# Patient Record
Sex: MALE | Race: WHITE | NOT HISPANIC OR LATINO | Employment: OTHER | URBAN - METROPOLITAN AREA
[De-identification: names, ages, dates, MRNs, and addresses within clinical notes are randomized per-mention and may not be internally consistent; named-entity substitution may affect disease eponyms.]

---

## 2019-10-14 ENCOUNTER — OFFICE VISIT (OUTPATIENT)
Dept: ENDOCRINOLOGY | Facility: CLINIC | Age: 59
End: 2019-10-14
Payer: MEDICARE

## 2019-10-14 ENCOUNTER — LAB VISIT (OUTPATIENT)
Dept: LAB | Facility: HOSPITAL | Age: 59
End: 2019-10-14
Attending: NURSE PRACTITIONER
Payer: MEDICARE

## 2019-10-14 ENCOUNTER — PATIENT OUTREACH (OUTPATIENT)
Dept: ADMINISTRATIVE | Facility: HOSPITAL | Age: 59
End: 2019-10-14

## 2019-10-14 VITALS
BODY MASS INDEX: 37.05 KG/M2 | RESPIRATION RATE: 18 BRPM | WEIGHT: 273.56 LBS | HEART RATE: 60 BPM | DIASTOLIC BLOOD PRESSURE: 74 MMHG | SYSTOLIC BLOOD PRESSURE: 98 MMHG | HEIGHT: 72 IN

## 2019-10-14 DIAGNOSIS — E11.65 TYPE 2 DIABETES MELLITUS WITH HYPERGLYCEMIA, WITHOUT LONG-TERM CURRENT USE OF INSULIN: Primary | ICD-10-CM

## 2019-10-14 DIAGNOSIS — Z86.73 H/O: CVA (CEREBROVASCULAR ACCIDENT): ICD-10-CM

## 2019-10-14 DIAGNOSIS — E66.01 CLASS 2 SEVERE OBESITY DUE TO EXCESS CALORIES WITH SERIOUS COMORBIDITY AND BODY MASS INDEX (BMI) OF 37.0 TO 37.9 IN ADULT: ICD-10-CM

## 2019-10-14 DIAGNOSIS — I25.10 CORONARY ARTERY DISEASE INVOLVING NATIVE CORONARY ARTERY OF NATIVE HEART WITHOUT ANGINA PECTORIS: ICD-10-CM

## 2019-10-14 DIAGNOSIS — E78.5 HYPERLIPIDEMIA, UNSPECIFIED HYPERLIPIDEMIA TYPE: ICD-10-CM

## 2019-10-14 DIAGNOSIS — E11.65 TYPE 2 DIABETES MELLITUS WITH HYPERGLYCEMIA, WITHOUT LONG-TERM CURRENT USE OF INSULIN: ICD-10-CM

## 2019-10-14 DIAGNOSIS — I10 ESSENTIAL HYPERTENSION: ICD-10-CM

## 2019-10-14 LAB
ALBUMIN SERPL BCP-MCNC: 3.6 G/DL (ref 3.5–5.2)
ALP SERPL-CCNC: 104 U/L (ref 55–135)
ALT SERPL W/O P-5'-P-CCNC: 18 U/L (ref 10–44)
ANION GAP SERPL CALC-SCNC: 9 MMOL/L (ref 8–16)
AST SERPL-CCNC: 18 U/L (ref 10–40)
BILIRUB SERPL-MCNC: 0.8 MG/DL (ref 0.1–1)
BUN SERPL-MCNC: 14 MG/DL (ref 6–20)
CALCIUM SERPL-MCNC: 9.6 MG/DL (ref 8.7–10.5)
CHLORIDE SERPL-SCNC: 101 MMOL/L (ref 95–110)
CO2 SERPL-SCNC: 27 MMOL/L (ref 23–29)
CREAT SERPL-MCNC: 0.8 MG/DL (ref 0.5–1.4)
EST. GFR  (AFRICAN AMERICAN): >60 ML/MIN/1.73 M^2
EST. GFR  (NON AFRICAN AMERICAN): >60 ML/MIN/1.73 M^2
ESTIMATED AVG GLUCOSE: 258 MG/DL (ref 68–131)
GLUCOSE SERPL-MCNC: 303 MG/DL (ref 70–110)
HBA1C MFR BLD HPLC: 10.6 % (ref 4–5.6)
POTASSIUM SERPL-SCNC: 4.2 MMOL/L (ref 3.5–5.1)
PROT SERPL-MCNC: 7.2 G/DL (ref 6–8.4)
SODIUM SERPL-SCNC: 137 MMOL/L (ref 136–145)
TSH SERPL DL<=0.005 MIU/L-ACNC: 0.73 UIU/ML (ref 0.4–4)

## 2019-10-14 PROCEDURE — 3078F PR MOST RECENT DIASTOLIC BLOOD PRESSURE < 80 MM HG: ICD-10-PCS | Mod: CPTII,S$GLB,, | Performed by: NURSE PRACTITIONER

## 2019-10-14 PROCEDURE — 80053 COMPREHEN METABOLIC PANEL: CPT

## 2019-10-14 PROCEDURE — 84443 ASSAY THYROID STIM HORMONE: CPT

## 2019-10-14 PROCEDURE — 99999 PR PBB SHADOW E&M-NEW PATIENT-LVL V: CPT | Mod: PBBFAC,,, | Performed by: NURSE PRACTITIONER

## 2019-10-14 PROCEDURE — 3074F PR MOST RECENT SYSTOLIC BLOOD PRESSURE < 130 MM HG: ICD-10-PCS | Mod: CPTII,S$GLB,, | Performed by: NURSE PRACTITIONER

## 2019-10-14 PROCEDURE — 83036 HEMOGLOBIN GLYCOSYLATED A1C: CPT

## 2019-10-14 PROCEDURE — 36415 COLL VENOUS BLD VENIPUNCTURE: CPT | Mod: PO

## 2019-10-14 PROCEDURE — 3008F BODY MASS INDEX DOCD: CPT | Mod: CPTII,S$GLB,, | Performed by: NURSE PRACTITIONER

## 2019-10-14 PROCEDURE — 99999 PR PBB SHADOW E&M-NEW PATIENT-LVL V: ICD-10-PCS | Mod: PBBFAC,,, | Performed by: NURSE PRACTITIONER

## 2019-10-14 PROCEDURE — 3078F DIAST BP <80 MM HG: CPT | Mod: CPTII,S$GLB,, | Performed by: NURSE PRACTITIONER

## 2019-10-14 PROCEDURE — 99204 PR OFFICE/OUTPT VISIT, NEW, LEVL IV, 45-59 MIN: ICD-10-PCS | Mod: S$GLB,,, | Performed by: NURSE PRACTITIONER

## 2019-10-14 PROCEDURE — 3008F PR BODY MASS INDEX (BMI) DOCUMENTED: ICD-10-PCS | Mod: CPTII,S$GLB,, | Performed by: NURSE PRACTITIONER

## 2019-10-14 PROCEDURE — 99204 OFFICE O/P NEW MOD 45 MIN: CPT | Mod: S$GLB,,, | Performed by: NURSE PRACTITIONER

## 2019-10-14 PROCEDURE — 3074F SYST BP LT 130 MM HG: CPT | Mod: CPTII,S$GLB,, | Performed by: NURSE PRACTITIONER

## 2019-10-14 RX ORDER — ALPRAZOLAM 1 MG/1
1 TABLET ORAL 3 TIMES DAILY PRN
Refills: 0 | COMMUNITY
Start: 2019-09-14

## 2019-10-14 RX ORDER — INSULIN DEGLUDEC 100 U/ML
18 INJECTION, SOLUTION SUBCUTANEOUS NIGHTLY
Qty: 15 ML | Refills: 6 | Status: SHIPPED | OUTPATIENT
Start: 2019-10-14 | End: 2019-10-28

## 2019-10-14 RX ORDER — CALCIUM CITRATE/VITAMIN D3 200MG-6.25
TABLET ORAL
Refills: 3 | COMMUNITY
Start: 2019-10-07 | End: 2020-12-30 | Stop reason: SDUPTHER

## 2019-10-14 RX ORDER — CLONIDINE HYDROCHLORIDE 0.3 MG/1
0.3 TABLET ORAL 2 TIMES DAILY
Refills: 2 | COMMUNITY
Start: 2019-09-19

## 2019-10-14 RX ORDER — GLYBURIDE 5 MG/1
TABLET ORAL
Refills: 4 | COMMUNITY
Start: 2019-09-19 | End: 2020-03-27 | Stop reason: SDUPTHER

## 2019-10-14 RX ORDER — CLOPIDOGREL BISULFATE 75 MG/1
TABLET ORAL
Refills: 1 | COMMUNITY
Start: 2019-10-02

## 2019-10-14 RX ORDER — PEN NEEDLE, DIABETIC 30 GX3/16"
NEEDLE, DISPOSABLE MISCELLANEOUS
Qty: 50 EACH | Refills: 6 | Status: SHIPPED | OUTPATIENT
Start: 2019-10-14 | End: 2020-12-30 | Stop reason: SDUPTHER

## 2019-10-14 NOTE — PROGRESS NOTES
CC: Mr. Pedro Rodriguez arrives today for management of Type 2 DM and review of chronic medical conditions, as listed in the Visit Diagnosis section of this encounter.       HPI: Mr. Pedro Rodriguez was diagnosed with Type 2 DM in his 40s. Initial treatment consisted of orals. Unknown FH of DM, as he is adopted. Denies hospitalizations due to DM.     This is his first time being seen in endocrine.     He is accompanied by his sister.     Patient states that metformin was discontinued after having stroke in summer of this year. He was instead started on glyburide. He states that ever since his glucose has been elevated. However, he admits that glucose was elevated before his stroke.     He does have an irregular sleeping pattern - may wake at 4:00 AM or 10:00 AM.    BG readings are checked 4x/day. Brings his meter, 7 day average: 255, 14 day average: 276, 30 day average: 282    Hypoglycemia: No    Missing Insulin/PO medication doses: No    Exercise: Walks 5 min at a time.    Dietary Habits: Eats 3 meals/day. No snacking. Was drinking several glasses of sweetened green tea but has cut down recently. .    Last DM education appointment: unknown    He does not have PCP but wishes to establish care with one.     He takes Xanax for anxiety. He is very worried about his BP today. He took his BP meds today as usual. He is not symptomatic.     His cardiologist is Dr. Zuniga. He follows every 4 months. He had CABG x 6 in ~ 2010.       CURRENT DIABETIC MEDS: Januvia 50 mg daily, glyburide 10 mg daily with breakfast.   Glucometer type: True Metrix    Previous DM treatments:  Metformin   Starlix?    Last Eye Exam: > 1 year. Will wait to reschedule once having better DM control.  Last Podiatry Exam: n/a    REVIEW OF SYSTEMS  Constitutional: no c/o fatigue, weakness, or weight loss.   Eyes: denies visual disturbances.  ENT: denies dysphagia. + hearing loss  Cardiac: no palpitations or chest pain.  Respiratory: no cough or dyspnea.  GI: no  c/o abdominal pain or nausea. Denies h/o pancreatitis.   : denies urinary frequency, burning, discharge, frequent UTIs  Skin: no lesions or rashes.  Musculoskeletal: denies difficulty mobilizing, denies muscle pains. Reports bilateral knee pain.   Neuro: no numbness, tingling, or parasthesias.  Endocrine: denies polyphagia, polydipsia, polyuria      Personally reviewed Past Medical, Surgical, Social History.    Vital Signs  BP 96/74   Pulse 60   Resp 18   Ht 6' (1.829 m)   Wt 124.1 kg (273 lb 9.5 oz)   BMI 37.11 kg/m²     Personally reviewed the below labs:    No results found for: HGBA1C    Chemistry    No results found for: NA, K, CL, CO2, BUN, CREATININE, GLU No results found for: CALCIUM, ALKPHOS, AST, ALT, BILITOT, ESTGFRAFRICA, EGFRNONAA       No results found for: CHOL  No results found for: HDL  No results found for: LDLCALC  No results found for: TRIG  No results found for: CHOLHDL    No results found for: MICALBCREAT  No results found for: TSH    CrCl cannot be calculated (No successful lab value found.).    No results found for: [id number]      PHYSICAL EXAMINATION  Constitutional: Appears well, no distress  Neck: Supple, trachea midline; no thyromegaly or nodules.   Respiratory: CTA, even and unlabored.  Cardiovascular: RRR, no murmurs, no carotid bruits. DP pulses  2+ bilaterally; no edema.    GI: bowel sounds active, no hernia noted  Lymph: no cervical or supraclavicular lymphadenopathy  Skin: warm and dry; no lipohypertrophy, or acanthosis nigracans observed.  Psych: anxious affect, pleasant mood, conversant  Musculoskeletal: steady gait, no clubbing, full ROM to all extremities  Neuro: no loss of protective sensation via 10 gm monofilament or vibratory exam bilaterally, DTR 1+ BUE/1+BLE.  Feet: no open wounds; appropriate footwear. + callus noted to lateral aspect of R hallux    A1c target < 7.5%      Assessment/Plan  1. Type 2 diabetes mellitus with hyperglycemia, without long-term current  use of insulin  -- Uncontrolled. Unclear why metformin was discontinued. Glucoses are globally elevated.   -- A1c, CMP, TSH today.  -- start Tresiba 18 units QHS (weight based using 0.3 u/kg). Discussed pen use/storage, proper timing, administration technique.   -- continue Januvia and glyburide for now.   -- based on lab findings, will possibly resume metformin.   -- check BG 2x/day, alternating times and bring log to review in 2 weeks    -- Discussed diagnosis of DM, A1c goals, progression of disease, long term complications and tx options.  -- Reviewed hypoglycemia management: treat with 1/2 glass of juice, 1/2 can regular coke, or 4 glucose tablets. Monitor and repeat treatment every 15 minutes until BG is >70 Then have a snack, which includes a complex carbohydrate and protein.     -- takes statin, ace-I, aspirin     2. Coronary artery disease involving native coronary artery of native heart without angina pectoris  --avoid hypoglycemia   -- follows with cardiology   3. Essential hypertension  -- low end of normal today. Pt is asymptimatic  -- due to pt's anxiety regarding his BP, I advised him to notify his cardiologist.    4. Hyperlipidemia, unspecified hyperlipidemia type  -- continue atorvastatin  -- will check lipid panel in the future   5. H/O: CVA (cerebrovascular accident)  -- avoid hypoglycemia   6. Class 2 severe obesity due to excess calories with serious comorbidity and body mass index (BMI) of 37.0 to 37.9 in adult  -- increasing insulin resistance  -- will consider GLP-1RA in place of DPP4-I, if appropriate       FOLLOW UP  Follow up in about 2 weeks (around 10/28/2019).   Patient instructed to bring BG logs to each follow up   Patient encouraged to call for any BG/medication issues, concerns, or questions.    Orders Placed This Encounter   Procedures    Hemoglobin A1c    Comprehensive metabolic panel    TSH

## 2019-10-14 NOTE — PATIENT INSTRUCTIONS
Notify your cardiologist of your blood pressures today:  96/74, 98/74      Hypoglycemia (Low Blood Sugar)     Fast-acting sugar includes a cup of nonfat milk.     Too little sugar (glucose) in your blood is called hypoglycemia or low blood sugar. Low blood sugar usually means anything lower than 70 mg/dL. Talk with your healthcare provider about your target range and what level is too low for you. Diabetes itself doesnt cause low blood sugar. But some of the treatments for diabetes, such as pills or insulin, may raise your risk for it. Low blood sugar may cause you to pass out or have a seizure. So always treat low blood sugar right away, but don't overeat.  Special note: Always carry a source of fast-acting sugar and a snack in case of hypoglycemia.   What you may notice  If you have low blood sugar, you may have one or more of these symptoms:  · Shakiness or dizziness  · Cold, clammy skin or sweating  · Feelings of hunger  · Headache  · Nervousness  · A hard, fast heartbeat  · Weakness  · Confusion or irritability  · Blurred vision  · Having nightmares or waking up confused or sweating  · Numbness or tingling in the lips or tongue  What you should do  Here are tips to follow if you have hypoglycemia:   · First check your blood sugar. If it is too low (out of your target range), eat or drink 15 to 20 grams of fast-acting sugar. This may be 3 to 4 glucose tablets, 4 ounces (half a cup) of fruit juice or regular (nondiet) soda, 8 ounces (1 cup) of fat-free milk, or 1 tablespoon of honey. Dont take more than this, or your blood sugar may go too high.  · Wait 15 minutes. Then recheck your blood sugar if you can.  · If your blood sugar is still too low, repeat the steps above and check your blood sugar again. If your blood sugar still has not returned to your target range, contact your healthcare provider or seek emergency care.  · Once your blood sugar returns to target range, eat a snack or meal.  Preventing low  blood sugar  Things you can do include the following:   · If your condition needs a strict treatment plan, eat your meals and snacks at the same times each day. Dont skip meals!  · If your treatment plan lets you change when you eat and what you eat, learn how to change the time and dose of your rapid-acting insulin to match this.   · Ask your healthcare provider if it is safe for you to drink alcohol. Never drink on an empty stomach.  · Take your medicine at the prescribed times.  · Always carry a source of fast-acting sugar and a snack when youre away from home.  Other things to do  Additional tips include the following:  · Carry a medical ID card, a compact USB drive, or wear a medical alert bracelet or necklace. It should say that you have diabetes. It should also say what to do if you pass out or have a seizure.  · Make sure your family, friends, and coworkers know the signs of low blood sugar. Tell them what to do if your blood sugar falls very low and you cant treat yourself.  · Keep a glucagon emergency kit handy. Be sure your family, friends, and coworkers know how and when to use it. Check it regularly and replace the glucagon before it expires.  · Talk with your health care team about other things you can do to prevent low blood sugar.     If you have unexplained hypoglycemia or hypoglycemia several times, call your healthcare provider.   Date Last Reviewed: 5/1/2016  © 9737-6535 [employer]. [address] [place] [address], [place] [address]. All rights reserved. This information is not intended as a substitute for professional medical care. Always follow your healthcare professional's instructions.

## 2019-10-15 ENCOUNTER — TELEPHONE (OUTPATIENT)
Dept: ENDOCRINOLOGY | Facility: CLINIC | Age: 59
End: 2019-10-15

## 2019-10-15 DIAGNOSIS — E11.65 TYPE 2 DIABETES MELLITUS WITH HYPERGLYCEMIA, WITHOUT LONG-TERM CURRENT USE OF INSULIN: Primary | ICD-10-CM

## 2019-10-15 RX ORDER — METFORMIN HYDROCHLORIDE 500 MG/1
1000 TABLET ORAL 2 TIMES DAILY WITH MEALS
Qty: 120 TABLET | Refills: 6 | Status: SHIPPED | OUTPATIENT
Start: 2019-10-15 | End: 2020-07-07

## 2019-10-15 NOTE — TELEPHONE ENCOUNTER
Reviewed lab results. Please call patient and notify him that A1c is elevated at 10.6%, which is a 3 month blood sugar average of 258. Thyroid level is normal. Liver and kidney function are normal.     We can go ahead and resume metformin.  Please review instructions below:  Add one metformin tablet weekly to a total of 2 tablets, twice daily:  Week 1: Start metformin daily - 1 tablet with breakfast  Week 2: take 1 tablet with breakfast and 1 with dinner  Week 3: take 2 tablets with breakfast and 1 with dinner  Week 4: take 2 tablets with breakfast and 2 with dinner.     This was sent to his pharmacy.    Also, please make sure that he is off of Starlix. It is still on his list but I don't believe he is taking. Should not be taking this with glyburide.      Please advise him to bring BG log to upcoming appt.

## 2019-10-28 ENCOUNTER — OFFICE VISIT (OUTPATIENT)
Dept: ENDOCRINOLOGY | Facility: CLINIC | Age: 59
End: 2019-10-28
Payer: MEDICARE

## 2019-10-28 VITALS
HEIGHT: 74 IN | BODY MASS INDEX: 35.72 KG/M2 | DIASTOLIC BLOOD PRESSURE: 82 MMHG | SYSTOLIC BLOOD PRESSURE: 118 MMHG | RESPIRATION RATE: 18 BRPM | WEIGHT: 278.31 LBS | HEART RATE: 72 BPM

## 2019-10-28 DIAGNOSIS — E11.65 TYPE 2 DIABETES MELLITUS WITH HYPERGLYCEMIA, WITHOUT LONG-TERM CURRENT USE OF INSULIN: Primary | ICD-10-CM

## 2019-10-28 DIAGNOSIS — E78.5 HYPERLIPIDEMIA, UNSPECIFIED HYPERLIPIDEMIA TYPE: ICD-10-CM

## 2019-10-28 DIAGNOSIS — I10 ESSENTIAL HYPERTENSION: ICD-10-CM

## 2019-10-28 DIAGNOSIS — Z86.73 H/O: CVA (CEREBROVASCULAR ACCIDENT): ICD-10-CM

## 2019-10-28 DIAGNOSIS — E66.9 OBESITY (BMI 30-39.9): ICD-10-CM

## 2019-10-28 DIAGNOSIS — I25.10 CORONARY ARTERY DISEASE INVOLVING NATIVE CORONARY ARTERY OF NATIVE HEART WITHOUT ANGINA PECTORIS: ICD-10-CM

## 2019-10-28 PROCEDURE — 99214 PR OFFICE/OUTPT VISIT, EST, LEVL IV, 30-39 MIN: ICD-10-PCS | Mod: S$GLB,,, | Performed by: NURSE PRACTITIONER

## 2019-10-28 PROCEDURE — 3079F PR MOST RECENT DIASTOLIC BLOOD PRESSURE 80-89 MM HG: ICD-10-PCS | Mod: CPTII,S$GLB,, | Performed by: NURSE PRACTITIONER

## 2019-10-28 PROCEDURE — 99214 OFFICE O/P EST MOD 30 MIN: CPT | Mod: S$GLB,,, | Performed by: NURSE PRACTITIONER

## 2019-10-28 PROCEDURE — 3046F PR MOST RECENT HEMOGLOBIN A1C LEVEL > 9.0%: ICD-10-PCS | Mod: CPTII,S$GLB,, | Performed by: NURSE PRACTITIONER

## 2019-10-28 PROCEDURE — 3046F HEMOGLOBIN A1C LEVEL >9.0%: CPT | Mod: CPTII,S$GLB,, | Performed by: NURSE PRACTITIONER

## 2019-10-28 PROCEDURE — 99999 PR PBB SHADOW E&M-EST. PATIENT-LVL III: CPT | Mod: PBBFAC,,, | Performed by: NURSE PRACTITIONER

## 2019-10-28 PROCEDURE — 3079F DIAST BP 80-89 MM HG: CPT | Mod: CPTII,S$GLB,, | Performed by: NURSE PRACTITIONER

## 2019-10-28 PROCEDURE — 3008F PR BODY MASS INDEX (BMI) DOCUMENTED: ICD-10-PCS | Mod: CPTII,S$GLB,, | Performed by: NURSE PRACTITIONER

## 2019-10-28 PROCEDURE — 3074F SYST BP LT 130 MM HG: CPT | Mod: CPTII,S$GLB,, | Performed by: NURSE PRACTITIONER

## 2019-10-28 PROCEDURE — 3008F BODY MASS INDEX DOCD: CPT | Mod: CPTII,S$GLB,, | Performed by: NURSE PRACTITIONER

## 2019-10-28 PROCEDURE — 3074F PR MOST RECENT SYSTOLIC BLOOD PRESSURE < 130 MM HG: ICD-10-PCS | Mod: CPTII,S$GLB,, | Performed by: NURSE PRACTITIONER

## 2019-10-28 PROCEDURE — 99999 PR PBB SHADOW E&M-EST. PATIENT-LVL III: ICD-10-PCS | Mod: PBBFAC,,, | Performed by: NURSE PRACTITIONER

## 2019-10-28 RX ORDER — INSULIN DEGLUDEC 100 U/ML
20 INJECTION, SOLUTION SUBCUTANEOUS NIGHTLY
Qty: 15 ML | Refills: 6
Start: 2019-10-28 | End: 2020-11-02

## 2019-10-28 NOTE — PROGRESS NOTES
"CC: Mr. Pedro Rodriguez arrives today for management of Type 2 DM and review of chronic medical conditions, as listed in the Visit Diagnosis section of this encounter.       HPI: Mr. Pedro Rodriguez was diagnosed with Type 2 DM in his 40s. Initial treatment consisted of orals. Unknown FH of DM, as he is adopted. Denies hospitalizations due to DM.   His cardiologist is Dr. Zuniga. He had CABG x 6 in ~ 2010.     He is accompanied by his sister.     Patient presents for 2 week log review after beginning insulin and resuming metformin. He states that he will be increasing his metformin again in a few days and denies GI upset.     His sleeping pattern has improved since his glucoses started decreasing. Patient states that he feels "much better."    BG readings are checked 2x/day.            Hypoglycemia: No but did have episode of sweating, which resolved with peanut butter.    Missing Insulin/PO medication doses: No    Exercise: Walks for short distances.     Dietary Habits: Eats 3 meals/day. No snacking. Avoiding sugary drinks.     Last DM education appointment: unknown        CURRENT DIABETIC MEDS: metformin 500 mg BID, Januvia 50 mg daily, glyburide 10 mg daily with breakfast, Tresiba 18 units QHS  Glucometer type: True Metrix    Previous DM treatments:  Metformin   Starlix?    Last Eye Exam: > 1 year. Will wait to reschedule once having better DM control.  Last Podiatry Exam: n/a    REVIEW OF SYSTEMS  Constitutional: no c/o fatigue, weakness, or weight loss.   Eyes: denies visual disturbances.  Cardiac: no palpitations or chest pain.  Respiratory: no cough or dyspnea.  GI: no c/o abdominal pain or nausea. Denies h/o pancreatitis.   Skin: no lesions or rashes.  Neuro: no numbness, tingling, or parasthesias.  Endocrine: denies polyphagia, polydipsia, polyuria      Personally reviewed Past Medical, Surgical, Social History.    Vital Signs  /82   Pulse 72   Resp 18   Ht 6' 2" (1.88 m)   Wt 126.2 kg (278 lb 5.3 oz)   " BMI 35.74 kg/m²     Personally reviewed the below labs:    Hemoglobin A1C   Date Value Ref Range Status   10/14/2019 10.6 (H) 4.0 - 5.6 % Final     Comment:     ADA Screening Guidelines:  5.7-6.4%  Consistent with prediabetes  >or=6.5%  Consistent with diabetes  High levels of fetal hemoglobin interfere with the HbA1C  assay. Heterozygous hemoglobin variants (HbS, HgC, etc)do  not significantly interfere with this assay.   However, presence of multiple variants may affect accuracy.         Chemistry        Component Value Date/Time     10/14/2019 1035    K 4.2 10/14/2019 1035     10/14/2019 1035    CO2 27 10/14/2019 1035    BUN 14 10/14/2019 1035    CREATININE 0.8 10/14/2019 1035     (H) 10/14/2019 1035        Component Value Date/Time    CALCIUM 9.6 10/14/2019 1035    ALKPHOS 104 10/14/2019 1035    AST 18 10/14/2019 1035    ALT 18 10/14/2019 1035    BILITOT 0.8 10/14/2019 1035    ESTGFRAFRICA >60.0 10/14/2019 1035    EGFRNONAA >60.0 10/14/2019 1035          No results found for: CHOL  No results found for: HDL  No results found for: LDLCALC  No results found for: TRIG  No results found for: CHOLHDL    No results found for: MICALBCREAT  Lab Results   Component Value Date    TSH 0.732 10/14/2019       CrCl cannot be calculated (Patient's most recent lab result is older than the maximum 7 days allowed.).    No results found for: [id number]        A1c target < 7.5%      Assessment/Plan  1. Type 2 diabetes mellitus with hyperglycemia, without long-term current use of insulin  -- Improving but glucoses remain globally elevated.   -- decrease glyburide to 5 mg daily  -- increase metformin to 1000 mg BID  -- increase Januvia to 100 mg daily  -- increase Tresiba to 20 units QHS.  -- check BG 2x/day    -- Discussed diagnosis of DM, A1c goals, progression of disease, long term complications and tx options.  -- Reviewed hypoglycemia management: treat with 1/2 glass of juice, 1/2 can regular coke, or 4  glucose tablets. Monitor and repeat treatment every 15 minutes until BG is >70 Then have a snack, which includes a complex carbohydrate and protein.     -- takes statin, ace-I, aspirin     2. Coronary artery disease involving native coronary artery of native heart without angina pectoris  -- avoid hypoglycemia   -- follows with cardiology   3. Essential hypertension  -- acceptable   -- continue current meds and monitor   4. Hyperlipidemia, unspecified hyperlipidemia type  -- continue atorvastatin  -- will check lipid panel with RTC   5. H/O: CVA (cerebrovascular accident)  -- avoid hypoglycemia   6. Obesity (BMI 30 to 39.9) -- increasing insulin resistance  -- can consider GLP-1RA in place of DPP4-I, if appropriate       Total Time and Counselin minutes, >50% time spent counseling as noted above in #1 A/P.       FOLLOW UP  Follow up in about 3 months (around 2020).   Patient instructed to bring BG logs to each follow up   Patient encouraged to call for any BG/medication issues, concerns, or questions.    Orders Placed This Encounter   Procedures    Hemoglobin A1c    Comprehensive metabolic panel    Lipid panel    Microalbumin/creatinine urine ratio

## 2019-10-28 NOTE — PATIENT INSTRUCTIONS
-- Increase metformin to 1000 mg twice daily.  -- Increase Januvia to 100 mg daily. You can take 2 of your 50 mg tablets until you fill the prescription for the 100 mg pill.  -- Increase Tresiba to 20 units nightly.  -- DECREASE glyburide to 5 mg daily.

## 2020-01-06 ENCOUNTER — TELEPHONE (OUTPATIENT)
Dept: ENDOCRINOLOGY | Facility: CLINIC | Age: 60
End: 2020-01-06

## 2020-01-06 NOTE — TELEPHONE ENCOUNTER
----- Message from Keaton Dangelo sent at 1/6/2020  1:14 PM CST -----  Contact: pt  Type:  Patient Returning Call    Who Called:  pt  Who Left Message for Patient:  Debbi?  Does the patient know what this is regarding?:  Appointment on 02/06/2020  Best Call Back Number: [phone]  Additional Information:  Pt is not sure when he will be able to r/s his appointment. Pt cancelled  the appointment on 02/06/2020 and will call back to r/s.

## 2020-02-21 DIAGNOSIS — E11.65 TYPE 2 DIABETES MELLITUS WITH HYPERGLYCEMIA, WITHOUT LONG-TERM CURRENT USE OF INSULIN: ICD-10-CM

## 2020-02-21 RX ORDER — SITAGLIPTIN 100 MG/1
TABLET, FILM COATED ORAL
Qty: 30 TABLET | Refills: 6 | Status: SHIPPED | OUTPATIENT
Start: 2020-02-21 | End: 2020-11-03

## 2020-03-27 RX ORDER — GLYBURIDE 5 MG/1
5 TABLET ORAL
Qty: 30 TABLET | Refills: 5 | Status: SHIPPED | OUTPATIENT
Start: 2020-03-27 | End: 2020-09-08

## 2020-12-30 ENCOUNTER — OFFICE VISIT (OUTPATIENT)
Dept: ENDOCRINOLOGY | Facility: CLINIC | Age: 60
End: 2020-12-30
Payer: MEDICARE

## 2020-12-30 DIAGNOSIS — E11.65 TYPE 2 DIABETES MELLITUS WITH HYPERGLYCEMIA, WITHOUT LONG-TERM CURRENT USE OF INSULIN: ICD-10-CM

## 2020-12-30 DIAGNOSIS — E11.9 TYPE 2 DIABETES MELLITUS WITHOUT COMPLICATION, WITH LONG-TERM CURRENT USE OF INSULIN: Primary | ICD-10-CM

## 2020-12-30 DIAGNOSIS — I10 ESSENTIAL HYPERTENSION: ICD-10-CM

## 2020-12-30 DIAGNOSIS — Z79.4 TYPE 2 DIABETES MELLITUS WITHOUT COMPLICATION, WITH LONG-TERM CURRENT USE OF INSULIN: Primary | ICD-10-CM

## 2020-12-30 DIAGNOSIS — E78.5 HYPERLIPIDEMIA, UNSPECIFIED HYPERLIPIDEMIA TYPE: ICD-10-CM

## 2020-12-30 DIAGNOSIS — I25.10 CORONARY ARTERY DISEASE INVOLVING NATIVE CORONARY ARTERY OF NATIVE HEART WITHOUT ANGINA PECTORIS: ICD-10-CM

## 2020-12-30 PROCEDURE — 99213 OFFICE O/P EST LOW 20 MIN: CPT | Mod: 95,,, | Performed by: NURSE PRACTITIONER

## 2020-12-30 PROCEDURE — 99213 PR OFFICE/OUTPT VISIT, EST, LEVL III, 20-29 MIN: ICD-10-PCS | Mod: 95,,, | Performed by: NURSE PRACTITIONER

## 2020-12-30 RX ORDER — CALCIUM CITRATE/VITAMIN D3 200MG-6.25
TABLET ORAL
Qty: 100 STRIP | Status: SHIPPED | OUTPATIENT
Start: 2020-12-30 | End: 2022-07-18

## 2020-12-30 RX ORDER — GLYBURIDE 5 MG/1
5 TABLET ORAL
Qty: 90 TABLET | Refills: 0 | Status: SHIPPED | OUTPATIENT
Start: 2020-12-30 | End: 2021-02-17 | Stop reason: SDUPTHER

## 2020-12-30 RX ORDER — METFORMIN HYDROCHLORIDE 500 MG/1
1000 TABLET ORAL 2 TIMES DAILY WITH MEALS
Qty: 360 TABLET | Refills: 0 | Status: SHIPPED | OUTPATIENT
Start: 2020-12-30 | End: 2021-02-17 | Stop reason: SDUPTHER

## 2020-12-30 RX ORDER — PEN NEEDLE, DIABETIC 30 GX3/16"
NEEDLE, DISPOSABLE MISCELLANEOUS
Qty: 50 EACH | Refills: 12 | Status: SHIPPED | OUTPATIENT
Start: 2020-12-30 | End: 2021-03-10 | Stop reason: SDUPTHER

## 2020-12-30 RX ORDER — INSULIN DEGLUDEC 100 U/ML
20 INJECTION, SOLUTION SUBCUTANEOUS NIGHTLY
Qty: 15 ML | Refills: 3 | Status: SHIPPED | OUTPATIENT
Start: 2020-12-30 | End: 2021-02-17 | Stop reason: SDUPTHER

## 2020-12-30 NOTE — PROGRESS NOTES
Subjective:       Patient ID: Daniel Rodriguez is a 60 y.o. male.    Chief Complaint: routine DM f/u     The patient location is: [place]  The chief complaint leading to consultation is: DM f/u     Visit type: audiovisual    Face to Face time with patient: 7:03 to 7:24--21 minutes.   36 minutes  minutes of total time spent on the encounter, which includes face to face time and non-face to face time preparing to see the patient (eg, review of tests), Obtaining and/or reviewing separately obtained history, Documenting clinical information in the electronic or other health record, Independently interpreting results (not separately reported) and communicating results to the patient/family/caregiver, or Care coordination (not separately reported).         Each patient to whom he or she provides medical services by telemedicine is:  (1) informed of the relationship between the physician and patient and the respective role of any other health care provider with respect to management of the patient; and (2) notified that he or she may decline to receive medical services by telemedicine and may withdraw from such care at any time.    Notes:   HPI CC: Mr. Pedro Rodriguez arrives today for management of Type 2 DM and review of chronic medical conditions, as listed in the Visit Diagnosis section of this encounter.       HPI: Mr. Pedro Rodriguez was diagnosed with Type 2 DM in his 40s. Initial treatment consisted of orals. Unknown FH of DM, as he is adopted. Denies hospitalizations due to DM.   His cardiologist is Dr. Zuniga. He had CABG x 6 in ~ 2010.     Interim Events: Pt of CHANDA Marina NP--new to me--last seen 10/2019.  No wt.  States /80.  No focal complaints. Checks glucoses sometimes TID--states FBs 150's, lunches 200, Rarely BT.  Isolated event of hypoglycemia.  Seems he got off track running around with a cousin and skipped a meal. Needs refills.  No labs.         Review of Systems   Constitutional: Negative for activity  change and fatigue.   HENT: Negative for hearing loss and trouble swallowing.    Eyes: Negative for photophobia and visual disturbance.        Last Eye Exam:    Respiratory: Negative for cough and shortness of breath.    Cardiovascular: Negative for chest pain and palpitations.   Gastrointestinal: Negative for constipation and diarrhea.   Genitourinary: Negative for frequency and urgency.   Musculoskeletal: Negative for arthralgias and myalgias.   Integumentary:  Negative for rash and wound.   Neurological: Negative for weakness and numbness.   Psychiatric/Behavioral: Negative for sleep disturbance. The patient is not nervous/anxious.          Objective:      Physical Exam  Constitutional:       Appearance: Normal appearance. He is obese.   HENT:      Head: Normocephalic and atraumatic.      Nose: Nose normal.   Neurological:      General: No focal deficit present.      Mental Status: He is alert and oriented to person, place, and time.   Psychiatric:         Mood and Affect: Mood normal.         Thought Content: Thought content normal.         Judgment: Judgment normal.         Hemoglobin A1C   Date Value Ref Range Status   10/14/2019 10.6 (H) 4.0 - 5.6 % Final     Comment:     ADA Screening Guidelines:  5.7-6.4%  Consistent with prediabetes  >or=6.5%  Consistent with diabetes  High levels of fetal hemoglobin interfere with the HbA1C  assay. Heterozygous hemoglobin variants (HbS, HgC, etc)do  not significantly interfere with this assay.   However, presence of multiple variants may affect accuracy.         Chemistry        Component Value Date/Time     10/14/2019 1035    K 4.2 10/14/2019 1035     10/14/2019 1035    CO2 27 10/14/2019 1035    BUN 14 10/14/2019 1035    CREATININE 0.8 10/14/2019 1035     (H) 10/14/2019 1035        Component Value Date/Time    CALCIUM 9.6 10/14/2019 1035    ALKPHOS 104 10/14/2019 1035    AST 18 10/14/2019 1035    ALT 18 10/14/2019 1035    BILITOT 0.8 10/14/2019 1035     "ESTGFRAFRICA >60.0 10/14/2019 1035    EGFRNONAA >60.0 10/14/2019 1035        No results found for: LDLCALC  Lab Results   Component Value Date    TSH 0.732 10/14/2019       Assessment:       1. Type 2 diabetes mellitus without complication, with long-term current use of insulin  Comprehensive Metabolic Panel    Hemoglobin A1C    Lipid Panel    Microalbumin/Creatinine Ratio, Urine    glyBURIDE (DIABETA) 5 MG tablet    SITagliptin (JANUVIA) 100 MG Tab    TRUE METRIX GLUCOSE TEST STRIP Strp   2. Coronary artery disease involving native coronary artery of native heart without angina pectoris     3. Essential hypertension     4. Hyperlipidemia, unspecified hyperlipidemia type     5. Type 2 diabetes mellitus with hyperglycemia, without long-term current use of insulin  insulin degludec (TRESIBA FLEXTOUCH U-100) 100 unit/mL (3 mL) InPn    metFORMIN (GLUCOPHAGE) 500 MG tablet    pen needle, diabetic (BD ULTRA-FINE BRIJESH PEN NEEDLE) 32 gauge x 5/32" Ndle       Plan:       No changes. --reassess labs, glucose log.   Glyburide not preferred per Beers Criteria in geriatrics, etc. Monitor renal function, Denies any hypoglycemia.   Will arrange labs.  Advised to bring glucose log to visit  Counseled on extensive use of NSAIDS in regard to renal implications.     ORDERS 12/30/2020  F/u Mid February with CHANDA Marina NP----like 8 am--needs to get ride from nephew----fasting cmp, lipids, a1c, tsh week prior.      "

## 2021-02-10 ENCOUNTER — LAB VISIT (OUTPATIENT)
Dept: LAB | Facility: HOSPITAL | Age: 61
End: 2021-02-10
Attending: NURSE PRACTITIONER
Payer: MEDICARE

## 2021-02-10 DIAGNOSIS — E11.9 TYPE 2 DIABETES MELLITUS WITHOUT COMPLICATION, WITH LONG-TERM CURRENT USE OF INSULIN: ICD-10-CM

## 2021-02-10 DIAGNOSIS — Z79.4 TYPE 2 DIABETES MELLITUS WITHOUT COMPLICATION, WITH LONG-TERM CURRENT USE OF INSULIN: ICD-10-CM

## 2021-02-10 LAB
ALBUMIN SERPL BCP-MCNC: 3.2 G/DL (ref 3.5–5.2)
ALP SERPL-CCNC: 110 U/L (ref 55–135)
ALT SERPL W/O P-5'-P-CCNC: 19 U/L (ref 10–44)
ANION GAP SERPL CALC-SCNC: 7 MMOL/L (ref 8–16)
AST SERPL-CCNC: 16 U/L (ref 10–40)
BILIRUB SERPL-MCNC: 0.9 MG/DL (ref 0.1–1)
BUN SERPL-MCNC: 14 MG/DL (ref 8–23)
CALCIUM SERPL-MCNC: 9.4 MG/DL (ref 8.7–10.5)
CHLORIDE SERPL-SCNC: 99 MMOL/L (ref 95–110)
CHOLEST SERPL-MCNC: 97 MG/DL (ref 120–199)
CHOLEST/HDLC SERPL: 4.2 {RATIO} (ref 2–5)
CO2 SERPL-SCNC: 31 MMOL/L (ref 23–29)
CREAT SERPL-MCNC: 0.8 MG/DL (ref 0.5–1.4)
EST. GFR  (AFRICAN AMERICAN): >60 ML/MIN/1.73 M^2
EST. GFR  (NON AFRICAN AMERICAN): >60 ML/MIN/1.73 M^2
ESTIMATED AVG GLUCOSE: 278 MG/DL (ref 68–131)
GLUCOSE SERPL-MCNC: 333 MG/DL (ref 70–110)
HBA1C MFR BLD: 11.3 % (ref 4–5.6)
HDLC SERPL-MCNC: 23 MG/DL (ref 40–75)
HDLC SERPL: 23.7 % (ref 20–50)
LDLC SERPL CALC-MCNC: 54.4 MG/DL (ref 63–159)
NONHDLC SERPL-MCNC: 74 MG/DL
POTASSIUM SERPL-SCNC: 4.9 MMOL/L (ref 3.5–5.1)
PROT SERPL-MCNC: 7 G/DL (ref 6–8.4)
SODIUM SERPL-SCNC: 137 MMOL/L (ref 136–145)
TRIGL SERPL-MCNC: 98 MG/DL (ref 30–150)

## 2021-02-10 PROCEDURE — 36415 COLL VENOUS BLD VENIPUNCTURE: CPT | Mod: PO

## 2021-02-10 PROCEDURE — 80061 LIPID PANEL: CPT

## 2021-02-10 PROCEDURE — 83036 HEMOGLOBIN GLYCOSYLATED A1C: CPT

## 2021-02-10 PROCEDURE — 80053 COMPREHEN METABOLIC PANEL: CPT

## 2021-02-17 ENCOUNTER — OFFICE VISIT (OUTPATIENT)
Dept: ENDOCRINOLOGY | Facility: CLINIC | Age: 61
End: 2021-02-17
Payer: MEDICARE

## 2021-02-17 DIAGNOSIS — Z86.73 H/O: CVA (CEREBROVASCULAR ACCIDENT): ICD-10-CM

## 2021-02-17 DIAGNOSIS — I10 ESSENTIAL HYPERTENSION: ICD-10-CM

## 2021-02-17 DIAGNOSIS — Z79.4 TYPE 2 DIABETES MELLITUS WITH DIABETIC NEPHROPATHY, WITH LONG-TERM CURRENT USE OF INSULIN: ICD-10-CM

## 2021-02-17 DIAGNOSIS — E11.65 TYPE 2 DIABETES MELLITUS WITH HYPERGLYCEMIA, WITHOUT LONG-TERM CURRENT USE OF INSULIN: Primary | ICD-10-CM

## 2021-02-17 DIAGNOSIS — E66.9 OBESITY (BMI 30-39.9): ICD-10-CM

## 2021-02-17 DIAGNOSIS — E78.5 HYPERLIPIDEMIA, UNSPECIFIED HYPERLIPIDEMIA TYPE: ICD-10-CM

## 2021-02-17 DIAGNOSIS — E11.21 TYPE 2 DIABETES MELLITUS WITH DIABETIC NEPHROPATHY, WITH LONG-TERM CURRENT USE OF INSULIN: ICD-10-CM

## 2021-02-17 DIAGNOSIS — I25.10 CORONARY ARTERY DISEASE INVOLVING NATIVE CORONARY ARTERY OF NATIVE HEART WITHOUT ANGINA PECTORIS: ICD-10-CM

## 2021-02-17 PROCEDURE — 99214 PR OFFICE/OUTPT VISIT, EST, LEVL IV, 30-39 MIN: ICD-10-PCS | Mod: 95,,, | Performed by: NURSE PRACTITIONER

## 2021-02-17 PROCEDURE — 99214 OFFICE O/P EST MOD 30 MIN: CPT | Mod: 95,,, | Performed by: NURSE PRACTITIONER

## 2021-02-17 PROCEDURE — 3046F PR MOST RECENT HEMOGLOBIN A1C LEVEL > 9.0%: ICD-10-PCS | Mod: CPTII,95,, | Performed by: NURSE PRACTITIONER

## 2021-02-17 PROCEDURE — 3046F HEMOGLOBIN A1C LEVEL >9.0%: CPT | Mod: CPTII,95,, | Performed by: NURSE PRACTITIONER

## 2021-02-17 RX ORDER — INSULIN DEGLUDEC 100 U/ML
28 INJECTION, SOLUTION SUBCUTANEOUS NIGHTLY
Qty: 30 ML | Refills: 1 | Status: SHIPPED | OUTPATIENT
Start: 2021-02-17 | End: 2021-02-23

## 2021-02-17 RX ORDER — METFORMIN HYDROCHLORIDE 500 MG/1
1000 TABLET ORAL 2 TIMES DAILY WITH MEALS
Qty: 360 TABLET | Refills: 1 | Status: SHIPPED | OUTPATIENT
Start: 2021-02-17 | End: 2021-07-21

## 2021-02-17 RX ORDER — GLYBURIDE 5 MG/1
5 TABLET ORAL
Qty: 90 TABLET | Refills: 1 | Status: SHIPPED | OUTPATIENT
Start: 2021-02-17 | End: 2021-03-10

## 2021-02-23 ENCOUNTER — NURSE TRIAGE (OUTPATIENT)
Dept: ADMINISTRATIVE | Facility: CLINIC | Age: 61
End: 2021-02-23

## 2021-02-23 ENCOUNTER — TELEPHONE (OUTPATIENT)
Dept: ENDOCRINOLOGY | Facility: CLINIC | Age: 61
End: 2021-02-23

## 2021-02-23 DIAGNOSIS — E11.65 TYPE 2 DIABETES MELLITUS WITH HYPERGLYCEMIA, WITHOUT LONG-TERM CURRENT USE OF INSULIN: ICD-10-CM

## 2021-02-23 RX ORDER — INSULIN DEGLUDEC 100 U/ML
38 INJECTION, SOLUTION SUBCUTANEOUS NIGHTLY
Qty: 30 ML | Refills: 1
Start: 2021-02-23 | End: 2021-03-10

## 2021-03-10 ENCOUNTER — PATIENT MESSAGE (OUTPATIENT)
Dept: ENDOCRINOLOGY | Facility: CLINIC | Age: 61
End: 2021-03-10

## 2021-03-10 ENCOUNTER — OFFICE VISIT (OUTPATIENT)
Dept: ENDOCRINOLOGY | Facility: CLINIC | Age: 61
End: 2021-03-10
Payer: MEDICARE

## 2021-03-10 DIAGNOSIS — E11.65 TYPE 2 DIABETES MELLITUS WITH HYPERGLYCEMIA, WITHOUT LONG-TERM CURRENT USE OF INSULIN: Primary | ICD-10-CM

## 2021-03-10 DIAGNOSIS — I10 ESSENTIAL HYPERTENSION: ICD-10-CM

## 2021-03-10 DIAGNOSIS — Z86.73 H/O: CVA (CEREBROVASCULAR ACCIDENT): ICD-10-CM

## 2021-03-10 DIAGNOSIS — E78.5 HYPERLIPIDEMIA, UNSPECIFIED HYPERLIPIDEMIA TYPE: ICD-10-CM

## 2021-03-10 DIAGNOSIS — I25.10 CORONARY ARTERY DISEASE INVOLVING NATIVE CORONARY ARTERY OF NATIVE HEART WITHOUT ANGINA PECTORIS: ICD-10-CM

## 2021-03-10 DIAGNOSIS — E11.21 TYPE 2 DIABETES MELLITUS WITH DIABETIC NEPHROPATHY, WITH LONG-TERM CURRENT USE OF INSULIN: ICD-10-CM

## 2021-03-10 DIAGNOSIS — Z79.4 TYPE 2 DIABETES MELLITUS WITH DIABETIC NEPHROPATHY, WITH LONG-TERM CURRENT USE OF INSULIN: ICD-10-CM

## 2021-03-10 PROCEDURE — 3046F PR MOST RECENT HEMOGLOBIN A1C LEVEL > 9.0%: ICD-10-PCS | Mod: CPTII,95,, | Performed by: NURSE PRACTITIONER

## 2021-03-10 PROCEDURE — 99214 PR OFFICE/OUTPT VISIT, EST, LEVL IV, 30-39 MIN: ICD-10-PCS | Mod: 95,,, | Performed by: NURSE PRACTITIONER

## 2021-03-10 PROCEDURE — 3046F HEMOGLOBIN A1C LEVEL >9.0%: CPT | Mod: CPTII,95,, | Performed by: NURSE PRACTITIONER

## 2021-03-10 PROCEDURE — 99214 OFFICE O/P EST MOD 30 MIN: CPT | Mod: 95,,, | Performed by: NURSE PRACTITIONER

## 2021-03-10 RX ORDER — PEN NEEDLE, DIABETIC 30 GX3/16"
NEEDLE, DISPOSABLE MISCELLANEOUS
Qty: 150 EACH | Refills: 5 | Status: SHIPPED | OUTPATIENT
Start: 2021-03-10 | End: 2022-01-03

## 2021-03-10 RX ORDER — AMLODIPINE BESYLATE 10 MG/1
10 TABLET ORAL DAILY
COMMUNITY

## 2021-03-10 RX ORDER — INSULIN DEGLUDEC 100 U/ML
44 INJECTION, SOLUTION SUBCUTANEOUS NIGHTLY
Qty: 15 ML | Refills: 5 | Status: SHIPPED | OUTPATIENT
Start: 2021-03-10 | End: 2021-10-26

## 2021-03-10 RX ORDER — INSULIN ASPART 100 [IU]/ML
12 INJECTION, SOLUTION INTRAVENOUS; SUBCUTANEOUS
Qty: 15 ML | Refills: 5 | Status: SHIPPED | OUTPATIENT
Start: 2021-03-10 | End: 2022-01-05 | Stop reason: SDUPTHER

## 2021-05-24 ENCOUNTER — NURSE TRIAGE (OUTPATIENT)
Dept: ADMINISTRATIVE | Facility: CLINIC | Age: 61
End: 2021-05-24

## 2021-05-24 ENCOUNTER — TELEPHONE (OUTPATIENT)
Dept: OTOLARYNGOLOGY | Facility: CLINIC | Age: 61
End: 2021-05-24

## 2022-01-05 DIAGNOSIS — E11.65 TYPE 2 DIABETES MELLITUS WITH HYPERGLYCEMIA, WITHOUT LONG-TERM CURRENT USE OF INSULIN: ICD-10-CM

## 2022-01-06 RX ORDER — PEN NEEDLE, DIABETIC 30 GX3/16"
NEEDLE, DISPOSABLE MISCELLANEOUS
Qty: 200 EACH | Refills: 2 | Status: SHIPPED | OUTPATIENT
Start: 2022-01-06

## 2022-01-06 RX ORDER — INSULIN DEGLUDEC 100 U/ML
INJECTION, SOLUTION SUBCUTANEOUS
Qty: 15 PEN | Refills: 2 | Status: SHIPPED | OUTPATIENT
Start: 2022-01-06 | End: 2022-04-26 | Stop reason: SDUPTHER

## 2022-01-06 RX ORDER — INSULIN ASPART 100 [IU]/ML
INJECTION, SOLUTION INTRAVENOUS; SUBCUTANEOUS
Qty: 15 ML | Refills: 2 | Status: SHIPPED | OUTPATIENT
Start: 2022-01-06 | End: 2022-04-26 | Stop reason: SDUPTHER

## 2022-04-26 DIAGNOSIS — E11.65 TYPE 2 DIABETES MELLITUS WITH HYPERGLYCEMIA, WITHOUT LONG-TERM CURRENT USE OF INSULIN: ICD-10-CM

## 2022-04-26 RX ORDER — METFORMIN HYDROCHLORIDE 1000 MG/1
TABLET ORAL
Qty: 180 TABLET | Refills: 0 | Status: SHIPPED | OUTPATIENT
Start: 2022-04-26 | End: 2022-10-25

## 2022-04-26 RX ORDER — INSULIN DEGLUDEC 100 U/ML
INJECTION, SOLUTION SUBCUTANEOUS
Qty: 15 PEN | Refills: 3 | Status: SHIPPED | OUTPATIENT
Start: 2022-04-26 | End: 2022-08-12

## 2022-04-26 RX ORDER — INSULIN ASPART 100 [IU]/ML
INJECTION, SOLUTION INTRAVENOUS; SUBCUTANEOUS
Qty: 15 ML | Refills: 3 | Status: SHIPPED | OUTPATIENT
Start: 2022-04-26 | End: 2022-08-12 | Stop reason: SDUPTHER

## 2022-04-26 NOTE — TELEPHONE ENCOUNTER
----- Message from Erica Mayer sent at 4/26/2022  2:44 PM CDT -----  Type:  RX Refill Request  Who Called: Patient  Refill or New Rx: refills   RX Name and Strength: all meds  How is the patient currently taking it? (ex. 1XDay):    Is this a 30 day or 90 day RX:    Preferred Pharmacy with phone number:  [facility] [address] [address]  Phone:  [phone]  Fax:  [phone]  Local or Mail Order: Local  Ordering Provider:    Best Call Back Number:  [phone]  Additional Information: Pt requesting refills on all meds.Pt was in a rush and could not list all meds needed.

## 2022-08-12 ENCOUNTER — TELEPHONE (OUTPATIENT)
Dept: ENDOCRINOLOGY | Facility: CLINIC | Age: 62
End: 2022-08-12

## 2022-08-12 ENCOUNTER — OFFICE VISIT (OUTPATIENT)
Dept: ENDOCRINOLOGY | Facility: CLINIC | Age: 62
End: 2022-08-12
Payer: MEDICARE

## 2022-08-12 DIAGNOSIS — E78.5 HYPERLIPIDEMIA, UNSPECIFIED HYPERLIPIDEMIA TYPE: ICD-10-CM

## 2022-08-12 DIAGNOSIS — I10 ESSENTIAL HYPERTENSION: ICD-10-CM

## 2022-08-12 DIAGNOSIS — Z86.73 H/O: CVA (CEREBROVASCULAR ACCIDENT): ICD-10-CM

## 2022-08-12 DIAGNOSIS — E11.65 TYPE 2 DIABETES MELLITUS WITH HYPERGLYCEMIA, WITHOUT LONG-TERM CURRENT USE OF INSULIN: Primary | ICD-10-CM

## 2022-08-12 DIAGNOSIS — Z79.4 TYPE 2 DIABETES MELLITUS WITH DIABETIC NEPHROPATHY, WITH LONG-TERM CURRENT USE OF INSULIN: ICD-10-CM

## 2022-08-12 DIAGNOSIS — I25.10 CORONARY ARTERY DISEASE INVOLVING NATIVE CORONARY ARTERY OF NATIVE HEART WITHOUT ANGINA PECTORIS: ICD-10-CM

## 2022-08-12 DIAGNOSIS — E11.21 TYPE 2 DIABETES MELLITUS WITH DIABETIC NEPHROPATHY, WITH LONG-TERM CURRENT USE OF INSULIN: ICD-10-CM

## 2022-08-12 PROCEDURE — 4010F PR ACE/ARB THEARPY RXD/TAKEN: ICD-10-PCS | Mod: CPTII,95,, | Performed by: NURSE PRACTITIONER

## 2022-08-12 PROCEDURE — 99214 PR OFFICE/OUTPT VISIT, EST, LEVL IV, 30-39 MIN: ICD-10-PCS | Mod: 95,,, | Performed by: NURSE PRACTITIONER

## 2022-08-12 PROCEDURE — 1160F RVW MEDS BY RX/DR IN RCRD: CPT | Mod: CPTII,95,, | Performed by: NURSE PRACTITIONER

## 2022-08-12 PROCEDURE — 1160F PR REVIEW ALL MEDS BY PRESCRIBER/CLIN PHARMACIST DOCUMENTED: ICD-10-PCS | Mod: CPTII,95,, | Performed by: NURSE PRACTITIONER

## 2022-08-12 PROCEDURE — 99214 OFFICE O/P EST MOD 30 MIN: CPT | Mod: 95,,, | Performed by: NURSE PRACTITIONER

## 2022-08-12 PROCEDURE — 1159F MED LIST DOCD IN RCRD: CPT | Mod: CPTII,95,, | Performed by: NURSE PRACTITIONER

## 2022-08-12 PROCEDURE — 1159F PR MEDICATION LIST DOCUMENTED IN MEDICAL RECORD: ICD-10-PCS | Mod: CPTII,95,, | Performed by: NURSE PRACTITIONER

## 2022-08-12 PROCEDURE — 4010F ACE/ARB THERAPY RXD/TAKEN: CPT | Mod: CPTII,95,, | Performed by: NURSE PRACTITIONER

## 2022-08-12 RX ORDER — SEMAGLUTIDE 1.34 MG/ML
0.25 INJECTION, SOLUTION SUBCUTANEOUS
Qty: 1 PEN | Refills: 6 | Status: SHIPPED | OUTPATIENT
Start: 2022-08-12 | End: 2023-01-24 | Stop reason: SDUPTHER

## 2022-08-12 RX ORDER — INSULIN DEGLUDEC 100 U/ML
INJECTION, SOLUTION SUBCUTANEOUS
Qty: 15 PEN | Refills: 3
Start: 2022-08-12 | End: 2022-11-28

## 2022-08-12 RX ORDER — INSULIN ASPART 100 [IU]/ML
INJECTION, SOLUTION INTRAVENOUS; SUBCUTANEOUS
Qty: 15 ML | Refills: 3
Start: 2022-08-12 | End: 2023-01-24 | Stop reason: SDUPTHER

## 2022-08-12 NOTE — PATIENT INSTRUCTIONS
Stop Januvia    Start Ozempic 0.25 mg weekly. After 4 weeks, increase dose to 0.5 mg weekly and stay at this dose. Notify me for extreme nausea, abdominal pain, diarrhea, constipation.    Increase Tresiba to 50 units nightly.     Increase Novolog to 16 units with meals    Continue metformin

## 2022-08-12 NOTE — PROGRESS NOTES
The patient location is:  Patient Home   The chief complaint leading to consultation is: Type 2 DM  Visit type: Virtual visit with synchronous audio and video  Total time spent with patient: 30 min  Each patient to whom he or she provides medical services by telemedicine is:  (1) informed of the relationship between the physician and patient and the respective role of any other health care provider with respect to management of the patient; and (2) notified that he or she may decline to receive medical services by telemedicine and may withdraw from such care at any time.       CC: Mr. Daniel Rodriguez arrives today for management of Type 2 DM and review of chronic medical conditions, as listed in the Visit Diagnosis section of this encounter.       HPI: Mr. Daniel Rodriguez was diagnosed with Type 2 DM in his 40s. Initial treatment consisted of orals. Unknown FH of DM, as he is adopted. Denies hospitalizations due to DM.   His cardiologist is Dr. Zuniga. He had CABG x 6 in ~ 2010.   H/o CVA.    Patient was last seen by me in March 2021. At this time, Novolog was initiated and Freestyle Nancy was ordered. He never heard from DME regarding Nancy.     Today, he states that he was off of the Tresiba for 1 month. He has since resumed. He also recently resumed Januvia after ~ 2 month hiatus.     BG readings are checked 3x/day. He reports that BG range 200-300.    Hypoglycemia: No     Missing Insulin/PO medication doses: Yes - see above    Exercise: Walks about 20 min daily to sister's house.     Dietary Habits: Eats 3 meals/day.  No snacking. Avoiding sugary drinks.     Last DM education appointment: unknown        CURRENT DIABETIC MEDS: metformin 1000 mg BID, Januvia 100 mg daily, Tresiba 48 units QHS, Novolog 14 units with meals.  Glucometer type: True Metrix    Previous DM treatments:  Starlix?  Glyburide     Last Eye Exam: 2022, [facility] provider. Denies   Last Podiatry Exam: n/a    REVIEW OF SYSTEMS  Constitutional: no  c/o fatigue, weakness. + reported weight loss.   Eyes: denies visual disturbances.  Cardiac: no palpitations or chest pain.  Respiratory: no cough or dyspnea.  GI: no c/o abdominal pain or nausea. Denies h/o pancreatitis.   Skin: no lesions or rashes.  Neuro: + numbness in feet   Endocrine: denies polyphagia, polyuria. + polydipsia      Personally reviewed Past Medical, Surgical, Social History.    Vital Signs  There were no vitals taken for this visit. -- video visit    Personally reviewed the below labs:    Hemoglobin A1C   Date Value Ref Range Status   02/10/2021 11.3 (H) 4.0 - 5.6 % Final     Comment:     ADA Screening Guidelines:  5.7-6.4%  Consistent with prediabetes  >or=6.5%  Consistent with diabetes    High levels of fetal hemoglobin interfere with the HbA1C  assay. Heterozygous hemoglobin variants (HbS, HgC, etc)do  not significantly interfere with this assay.   However, presence of multiple variants may affect accuracy.     10/14/2019 10.6 (H) 4.0 - 5.6 % Final     Comment:     ADA Screening Guidelines:  5.7-6.4%  Consistent with prediabetes  >or=6.5%  Consistent with diabetes  High levels of fetal hemoglobin interfere with the HbA1C  assay. Heterozygous hemoglobin variants (HbS, HgC, etc)do  not significantly interfere with this assay.   However, presence of multiple variants may affect accuracy.         Chemistry        Component Value Date/Time     02/10/2021 0811    K 4.9 02/10/2021 0811    CL 99 02/10/2021 0811    CO2 31 (H) 02/10/2021 0811    BUN 14 02/10/2021 0811    CREATININE 0.8 02/10/2021 0811     (H) 02/10/2021 0811        Component Value Date/Time    CALCIUM 9.4 02/10/2021 0811    ALKPHOS 110 02/10/2021 0811    AST 16 02/10/2021 0811    ALT 19 02/10/2021 0811    BILITOT 0.9 02/10/2021 0811    ESTGFRAFRICA >60.0 02/10/2021 0811    EGFRNONAA >60.0 02/10/2021 0811          Lab Results   Component Value Date    CHOL 97 (L) 02/10/2021     Lab Results   Component Value Date    HDL 23  (L) 02/10/2021     Lab Results   Component Value Date    LDLCALC 54.4 (L) 02/10/2021     Lab Results   Component Value Date    TRIG 98 02/10/2021     Lab Results   Component Value Date    CHOLHDL 23.7 02/10/2021       Lab Results   Component Value Date    MICALBCREAT 920.1 (H) 02/10/2021     Lab Results   Component Value Date    TSH 0.732 10/14/2019       CrCl cannot be calculated (Patient's most recent lab result is older than the maximum 7 days allowed.).    No results found for: [id number]    PHYSICAL EXAMINATION  Deferred - video visit       Goals      HEMOGLOBIN A1C < 7.5               Assessment/Plan  1. Type 2 diabetes mellitus with hyperglycemia, without long-term current use of insulin  -- Uncontrolled. Needs lab work: A1c, CMP, lipid panel, urine mcr next week. Would benefit from GLP-1RA in place of Januvia.   -- discontinue Januvia  -- start Ozempic 0.25 mg weekly. After 4 weeks, increase dose to 0.5 mg weekly. Discussed medication's mechanism of action, side effects, and contraindications. Advised pt to notify me for extreme nausea, abdominal pain, etc.  -- increase Novolog 16 units AC.   -- increase Tresiba to 50 units.   -- continue metformin  -- check BG 4x/day  -- would benefit from [employer] Nancy 2 CGMS. Will fax order to [employer].    -- Patient currently manages Type 2 diabetes mellitus with an intensive insulin regimen consisting of 4 insulin injections/day. The patient has been using SMBG for frequent glucose monitoring (will be checking 4+ times per day as of today). The patient requires a therapeutic CGM and is willing to use the CGM for the necessary frequent self-adjustments of insulin therapy. This is a medical necessity. I will continue to have in-person visits every 3 months to assess adherence to his CGM regimen and diabetes treatment plan.     -- Discussed diagnosis of DM, A1c goals, progression of disease, long term complications and tx options.  -- Reviewed hypoglycemia  management: treat with 1/2 glass of juice, 1/2 can regular coke, or 4 glucose tablets. Monitor and repeat treatment every 15 minutes until BG is >70 Then have a snack, which includes a complex carbohydrate and protein.     -- takes statin, ace-I, aspirin     2. Type 2 diabetes mellitus with diabetic nephropathy, with long-term current use of insulin -- optimize DM control  -- continue lisinopril   3. Coronary artery disease involving native coronary artery of native heart without angina pectoris  -- avoid hypoglycemia   -- follows with cardiology  -- Ozempic may add benefit   4. Essential hypertension  -- continue current meds   -- managed by cardiology   5. Hyperlipidemia, unspecified hyperlipidemia type  -- previously controlled  -- continue atorvastatin  -- lipid panel ordered   6. H/O: CVA (cerebrovascular accident)  -- avoid hypoglycemia         FOLLOW UP  Follow up in about 3 months (around 11/12/2022).   Patient instructed to bring BG logs to each follow up   Patient encouraged to call for any BG/medication issues, concerns, or questions.    Orders Placed This Encounter   Procedures    Hemoglobin A1C    Lipid Panel    Comprehensive Metabolic Panel    Microalbumin/Creatinine Ratio, Urine

## 2022-08-25 ENCOUNTER — TELEPHONE (OUTPATIENT)
Dept: ENDOCRINOLOGY | Facility: CLINIC | Age: 62
End: 2022-08-25
Payer: MEDICARE

## 2022-08-25 NOTE — TELEPHONE ENCOUNTER
S/w and notified him that I talked to [employer] rep and it seems the ICD code in the notes were not matching the ones in the order. They will refax the orders back to us and we will fix it and fax it back to [facility].Pt verb understanding

## 2022-08-25 NOTE — TELEPHONE ENCOUNTER
----- Message from Serg Das sent at 8/25/2022  4:31 PM CDT -----  Contact: patient  Type:  Patient Call          Who Called:patient         Does the patient know what this is regarding?:requesting a call back ; [facility] is waiting for documents so that the patient can get the equipment he needs ;please advise           Would the patient rather a call back or a response via [facility]? Call           Best Call Back Number:[phone]             Additional Information:[facility]  [phone]

## 2022-12-01 ENCOUNTER — TELEPHONE (OUTPATIENT)
Dept: ENDOCRINOLOGY | Facility: CLINIC | Age: 62
End: 2022-12-01
Payer: MEDICARE

## 2022-12-01 NOTE — TELEPHONE ENCOUNTER
----- Message from Bakari Vargas Patient Care Assistant sent at 12/1/2022  8:34 AM CST -----  Contact: Pt  Type:  Sooner Appointment Request    Caller is requesting a sooner appointment.  Caller declined first available appointment listed below.  Caller will not accept being placed on the waitlist and is requesting a message be sent to doctor.    Name of Caller:  Pt  When is the first available appointment?  05/2023  Symptoms:  3 Month Follow Up/Medication Refill  Best Call Back Number:  [phone] (home)   Additional Information:  Please advise

## 2022-12-13 ENCOUNTER — TELEPHONE (OUTPATIENT)
Dept: ENDOCRINOLOGY | Facility: CLINIC | Age: 62
End: 2022-12-13
Payer: MEDICARE

## 2022-12-13 NOTE — TELEPHONE ENCOUNTER
----- Message from Yosvany Hernandez sent at 12/13/2022  3:09 PM CST -----  Type:  Patient Returning Call    Who Called:  Patient  Who Left Message for Patient:  Kimberley  Does the patient know what this is regarding?:  Appointment  Best Call Back Number:  [phone]  Additional Information:

## 2022-12-14 ENCOUNTER — TELEPHONE (OUTPATIENT)
Dept: ENDOCRINOLOGY | Facility: CLINIC | Age: 62
End: 2022-12-14
Payer: MEDICARE

## 2022-12-14 NOTE — TELEPHONE ENCOUNTER
Attempted to contact pt. No answer. LVM advising scheduled virtual appt to tomorrow (12/15) at 10am with Kailey Marina.

## 2022-12-14 NOTE — TELEPHONE ENCOUNTER
----- Message from Fili Plata sent at 12/14/2022  3:35 PM CST -----  Regarding: sooner appt  Type:  Sooner Appointment Request    Caller is requesting a sooner appointment.  Caller declined first available appointment listed below.  Caller will not accept being placed on the waitlist and is requesting a message be sent to doctor.    Name of Caller:  Daniel    When is the first available appointment?  12/15/22 10am    Symptoms:  f/u VV    Best Call Back Number:  [phone]    Additional Information:

## 2023-01-12 ENCOUNTER — TELEPHONE (OUTPATIENT)
Dept: ENDOCRINOLOGY | Facility: CLINIC | Age: 63
End: 2023-01-12
Payer: MEDICARE

## 2023-01-12 NOTE — TELEPHONE ENCOUNTER
Called pt back but no answer  Left voicemail advising pt has orders already in for fasting labs and urine. Just needs to call back and schedule those at his convenience before next weeks virtual visit

## 2023-01-12 NOTE — TELEPHONE ENCOUNTER
----- Message from Ina Martinez sent at 1/12/2023  7:06 AM CST -----  Regarding: orders  Contact: patient  Patient want to know if office can put orders in for bloodwork, any questions please call back at [phone] (home)     Case number [id number]

## 2023-01-24 ENCOUNTER — TELEPHONE (OUTPATIENT)
Dept: ENDOCRINOLOGY | Facility: CLINIC | Age: 63
End: 2023-01-24
Payer: MEDICARE

## 2023-01-24 ENCOUNTER — OFFICE VISIT (OUTPATIENT)
Dept: ENDOCRINOLOGY | Facility: CLINIC | Age: 63
End: 2023-01-24
Payer: MEDICARE

## 2023-01-24 DIAGNOSIS — E11.21 TYPE 2 DIABETES MELLITUS WITH DIABETIC NEPHROPATHY, WITH LONG-TERM CURRENT USE OF INSULIN: ICD-10-CM

## 2023-01-24 DIAGNOSIS — E78.5 HYPERLIPIDEMIA, UNSPECIFIED HYPERLIPIDEMIA TYPE: ICD-10-CM

## 2023-01-24 DIAGNOSIS — I10 ESSENTIAL HYPERTENSION: ICD-10-CM

## 2023-01-24 DIAGNOSIS — Z79.4 TYPE 2 DIABETES MELLITUS WITH DIABETIC NEPHROPATHY, WITH LONG-TERM CURRENT USE OF INSULIN: ICD-10-CM

## 2023-01-24 DIAGNOSIS — N48.1 BALANITIS: ICD-10-CM

## 2023-01-24 DIAGNOSIS — E11.65 TYPE 2 DIABETES MELLITUS WITH HYPERGLYCEMIA, WITHOUT LONG-TERM CURRENT USE OF INSULIN: Primary | ICD-10-CM

## 2023-01-24 DIAGNOSIS — I25.10 CORONARY ARTERY DISEASE INVOLVING NATIVE CORONARY ARTERY OF NATIVE HEART WITHOUT ANGINA PECTORIS: ICD-10-CM

## 2023-01-24 DIAGNOSIS — Z86.73 H/O: CVA (CEREBROVASCULAR ACCIDENT): ICD-10-CM

## 2023-01-24 PROCEDURE — 3066F NEPHROPATHY DOC TX: CPT | Mod: CPTII,95,, | Performed by: NURSE PRACTITIONER

## 2023-01-24 PROCEDURE — 1159F PR MEDICATION LIST DOCUMENTED IN MEDICAL RECORD: ICD-10-PCS | Mod: CPTII,95,, | Performed by: NURSE PRACTITIONER

## 2023-01-24 PROCEDURE — 1159F MED LIST DOCD IN RCRD: CPT | Mod: CPTII,95,, | Performed by: NURSE PRACTITIONER

## 2023-01-24 PROCEDURE — 3046F PR MOST RECENT HEMOGLOBIN A1C LEVEL > 9.0%: ICD-10-PCS | Mod: CPTII,95,, | Performed by: NURSE PRACTITIONER

## 2023-01-24 PROCEDURE — 1160F RVW MEDS BY RX/DR IN RCRD: CPT | Mod: CPTII,95,, | Performed by: NURSE PRACTITIONER

## 2023-01-24 PROCEDURE — 1160F PR REVIEW ALL MEDS BY PRESCRIBER/CLIN PHARMACIST DOCUMENTED: ICD-10-PCS | Mod: CPTII,95,, | Performed by: NURSE PRACTITIONER

## 2023-01-24 PROCEDURE — 3046F HEMOGLOBIN A1C LEVEL >9.0%: CPT | Mod: CPTII,95,, | Performed by: NURSE PRACTITIONER

## 2023-01-24 PROCEDURE — 3061F NEG MICROALBUMINURIA REV: CPT | Mod: CPTII,95,, | Performed by: NURSE PRACTITIONER

## 2023-01-24 PROCEDURE — 3061F PR NEG MICROALBUMINURIA RESULT DOCUMENTED/REVIEW: ICD-10-PCS | Mod: CPTII,95,, | Performed by: NURSE PRACTITIONER

## 2023-01-24 PROCEDURE — 3066F PR DOCUMENTATION OF TREATMENT FOR NEPHROPATHY: ICD-10-PCS | Mod: CPTII,95,, | Performed by: NURSE PRACTITIONER

## 2023-01-24 PROCEDURE — 99214 PR OFFICE/OUTPT VISIT, EST, LEVL IV, 30-39 MIN: ICD-10-PCS | Mod: 95,,, | Performed by: NURSE PRACTITIONER

## 2023-01-24 PROCEDURE — 99214 OFFICE O/P EST MOD 30 MIN: CPT | Mod: 95,,, | Performed by: NURSE PRACTITIONER

## 2023-01-24 RX ORDER — FLUCONAZOLE 150 MG/1
150 TABLET ORAL DAILY
Qty: 1 TABLET | Refills: 1 | Status: SHIPPED | OUTPATIENT
Start: 2023-01-24 | End: 2023-01-25

## 2023-01-24 RX ORDER — INSULIN ASPART 100 [IU]/ML
INJECTION, SOLUTION INTRAVENOUS; SUBCUTANEOUS
Qty: 15 ML | Refills: 6 | Status: SHIPPED | OUTPATIENT
Start: 2023-01-24 | End: 2023-08-01

## 2023-01-24 RX ORDER — INSULIN DEGLUDEC 100 U/ML
INJECTION, SOLUTION SUBCUTANEOUS
Qty: 5 PEN | Refills: 6 | Status: SHIPPED | OUTPATIENT
Start: 2023-01-24 | End: 2023-08-01

## 2023-01-24 RX ORDER — SEMAGLUTIDE 1.34 MG/ML
0.25 INJECTION, SOLUTION SUBCUTANEOUS
Qty: 1 PEN | Refills: 6 | Status: SHIPPED | OUTPATIENT
Start: 2023-01-24 | End: 2023-08-01

## 2023-01-24 NOTE — PATIENT INSTRUCTIONS
Start Ozempic 0.25 mg weekly. After 4 weeks, increase dose to 0.5 mg weekly and stay there going forward. Notify me for extreme nausea, abdominal pain, etc.    Increase Novolog 18 units with meals.    Increase Tresiba to 56 units units at night.    Continue metformin.

## 2023-01-24 NOTE — PROGRESS NOTES
The patient location is:  Patient Home   The chief complaint leading to consultation is: Type 2 DM  Visit type: Virtual visit with synchronous audio and video  Total time spent with patient: 30 min  Each patient to whom he or she provides medical services by telemedicine is:  (1) informed of the relationship between the physician and patient and the respective role of any other health care provider with respect to management of the patient; and (2) notified that he or she may decline to receive medical services by telemedicine and may withdraw from such care at any time.       CC: Mr. Daniel Rodriguez arrives today for management of Type 2 DM and review of chronic medical conditions, as listed in the Visit Diagnosis section of this encounter.       HPI: Mr. Daniel Rodriguez was diagnosed with Type 2 DM in his 40s. Initial treatment consisted of orals. Unknown FH of DM, as he is adopted. Denies hospitalizations due to DM.   His cardiologist is Dr. Zuniga. He had CABG x 6 in ~ 2010.   H/o CVA.    Patient was last seen by me in August. Ozempic was added at that time.     Today, he states that he chose not to start Ozempic because his blood sugars had improved.     He reports some memory issues but denies missing insulin doses.     BG readings are checked 3x/day. Reports most -300s.  He received Dexcom from [facility] and his cousin, who uses one and is is my patient, will be helping him start it. Patient has transportation issues so would prefer this over coming in to meet with educator. Unsure how he got this device, as we had ordered Freestyle yosvany 2 for him.     Hypoglycemia: No     Missing Insulin/PO medication doses: Denies     Exercise: Hasn't been walking but has been doing push ups, stretches    Dietary Habits: Eats 3 meals/day.  May snack on cheese. Had been drinking juice but is watering it down.     Last DM education appointment: unknown        CURRENT DIABETIC MEDS: metformin 1000 mg BID, Tresiba 50 units QHS,  Novolog 16 units with meals.  Glucometer type: True Metrix    Previous DM treatments:  Starlix?  Glyburide     Last Eye Exam: , [facility] carney. Denies   Last Podiatry Exam: n/a    REVIEW OF SYSTEMS  Constitutional: no c/o fatigue, weakness, weight loss. + weight gain  Cardiac: no palpitations or chest pain.  Respiratory: no cough or dyspnea.  GI: no c/o abdominal pain or nausea. Denies h/o pancreatitis.   : reports red rash on foreskin when BG is elevated. Having problem now. Has been using  Nystatin without relief    Skin: no lesions or rashes.  Neuro: + tingling in feet   Endocrine: denies polyphagia. + polyuria, polydipsia      Personally reviewed Past Medical, Surgical, Social History.    Vital Signs  There were no vitals taken for this visit. -- video visit    Personally reviewed the below labs:    Hemoglobin A1C   Date Value Ref Range Status   2023 12.2 (H) 0.0 - 5.6 % Final     Comment:     Reference Interval:  5.0 - 5.6 Normal   5.7 - 6.4 High Risk   > 6.5 Diabetic      Hgb A1c results are standardized based on the (NGSP) National   Glycohemoglobin Standardization Program.      Hemoglobin A1C levels are related to mean serum/plasma glucose   during the preceding 2-3 months.        02/10/2021 11.3 (H) 4.0 - 5.6 % Final     Comment:     ADA Screening Guidelines:  5.7-6.4%  Consistent with prediabetes  >or=6.5%  Consistent with diabetes    High levels of fetal hemoglobin interfere with the HbA1C  assay. Heterozygous hemoglobin variants (HbS, HgC, etc)do  not significantly interfere with this assay.   However, presence of multiple variants may affect accuracy.     10/14/2019 10.6 (H) 4.0 - 5.6 % Final     Comment:     ADA Screening Guidelines:  5.7-6.4%  Consistent with prediabetes  >or=6.5%  Consistent with diabetes  High levels of fetal hemoglobin interfere with the HbA1C  assay. Heterozygous hemoglobin variants (HbS, HgC, etc)do  not significantly interfere with this assay.    However, presence of multiple variants may affect accuracy.         Chemistry        Component Value Date/Time     01/20/2023 0829    K 4.0 01/20/2023 0829     01/20/2023 0829    CO2 33 (H) 01/20/2023 0829    BUN 18 01/20/2023 0829    CREATININE 0.63 01/20/2023 0829     (H) 01/20/2023 0829        Component Value Date/Time    CALCIUM 8.9 01/20/2023 0829    ALKPHOS 148 (H) 01/20/2023 0829    AST 22 01/20/2023 0829    ALT 15 01/20/2023 0829    BILITOT 0.6 01/20/2023 0829    ESTGFRAFRICA >60.0 02/10/2021 0811    EGFRNONAA >60.0 02/10/2021 0811          Lab Results   Component Value Date    CHOL 141 01/20/2023    CHOL 97 (L) 02/10/2021     Lab Results   Component Value Date    HDL 29 (L) 01/20/2023    HDL 23 (L) 02/10/2021     Lab Results   Component Value Date    LDLCALC 78.6 01/20/2023    LDLCALC 54.4 (L) 02/10/2021     Lab Results   Component Value Date    TRIG 167 (H) 01/20/2023    TRIG 98 02/10/2021     Lab Results   Component Value Date    CHOLHDL 20.6 01/20/2023    CHOLHDL 23.7 02/10/2021       Lab Results   Component Value Date    MICALBCREAT Unable to calculate 01/20/2023     Lab Results   Component Value Date    TSH 0.732 10/14/2019       CrCl cannot be calculated (Unknown ideal weight.).    No results found for: [id number]    PHYSICAL EXAMINATION  Deferred - video visit       Goals        HEMOGLOBIN A1C < 7.5                 Assessment/Plan  1. Type 2 diabetes mellitus with hyperglycemia, without long-term current use of insulin  -- Chronically and significantly uncontrolled. Needs to have better method for BG monitoring so I can see patterns.  -- start Dexcom G6. Advised pt to notify me if he needs further training with the educator.   -- start Ozempic 0.25 mg weekly. After 4 weeks, increase dose to 0.5 mg weekly. Discussed medication's mechanism of action, side effects, and contraindications. Advised pt to notify me for extreme nausea, abdominal pain, etc.  -- increase Novolog 18 units  AC.   -- increase Tresiba to 56 units.   -- continue metformin  -- stop drinking juice!    -- Patient currently manages Type 2 diabetes mellitus with an intensive insulin regimen consisting of 4 insulin injections/day. The patient has been using SMBG for frequent glucose monitoring (will be checking 4+ times per day as of today). The patient requires a therapeutic CGM and is willing to use the CGM for the necessary frequent self-adjustments of insulin therapy. This is a medical necessity. I will continue to have in-person visits every 3 months to assess adherence to his CGM regimen and diabetes treatment plan.     -- Discussed diagnosis of DM, A1c goals, progression of disease, long term complications and tx options.  -- Reviewed hypoglycemia management: treat with 1/2 glass of juice, 1/2 can regular coke, or 4 glucose tablets. Monitor and repeat treatment every 15 minutes until BG is >70 Then have a snack, which includes a complex carbohydrate and protein.     -- takes statin, ace-I, aspirin     2. Type 2 diabetes mellitus with diabetic nephropathy, with long-term current use of insulin -- optimize DM control  -- continue lisinopril   3. Coronary artery disease involving native coronary artery of native heart without angina pectoris  -- avoid hypoglycemia   -- follows with cardiology  -- Ozempic may add benefit   4. Essential hypertension  -- continue current meds   -- managed by cardiology   5. Hyperlipidemia, unspecified hyperlipidemia type  -- uncontrolled with mildly elevated TRG  -- continue atorvastatin  -- optimize glycemic control   6. H/O: CVA (cerebrovascular accident)  -- avoid hypoglycemia   7. Balanitis  -- fluconazole 150 mg once. May repeat if no relief in 3 days.   -- optimize DM control         FOLLOW UP  Follow up in about 3 months (around 4/24/2023).   Patient instructed to bring BG logs to each follow up   Patient encouraged to call for any BG/medication issues, concerns, or questions.    Orders  Placed This Encounter   Procedures    Hemoglobin A1C    Comprehensive Metabolic Panel

## 2023-02-09 ENCOUNTER — TELEPHONE (OUTPATIENT)
Dept: RHEUMATOLOGY | Facility: CLINIC | Age: 63
End: 2023-02-09
Payer: MEDICARE

## 2023-02-09 NOTE — TELEPHONE ENCOUNTER
Spoke to pt and he stated he had recvd call from [facility] and they are sending him supplies for yosvany. He thought they told him it was insulin pump and giving insulin and was worried about taking reg insulin. Adv pt it is CGM not insulin pump and that he will need to cont reg insulin shots, voiced understanding.

## 2023-03-20 ENCOUNTER — TELEPHONE (OUTPATIENT)
Dept: ENDOCRINOLOGY | Facility: CLINIC | Age: 63
End: 2023-03-20
Payer: MEDICARE

## 2023-03-20 NOTE — TELEPHONE ENCOUNTER
----- Message from Traci Mast sent at 3/20/2023  1:56 PM CDT -----  Regarding: ret call  Type:  Patient Returning Call    Who Called:  Walter  Who Left Message for Patient:  Sha  Does the patient know what this is regarding?:  no  Best Call Back Number:  [phone]    Additional Information:

## 2023-03-20 NOTE — TELEPHONE ENCOUNTER
----- Message from Noemí Zuñiga sent at 3/20/2023 11:07 AM CDT -----  Contact: pt  Pt is calling wanting a call back about medication  Please give pt a call back [phone]

## 2023-03-20 NOTE — TELEPHONE ENCOUNTER
I would recommend that he get this approved and okayed by his cardiologist given the fact that he takes medications such as sotalol and lisinopril. Viagra/Cialis may enhance the hypotensive effect of Blood Pressure Lowering Agents. Also need to ok use of this med class with his coronary artery disease.

## 2023-03-27 ENCOUNTER — TELEPHONE (OUTPATIENT)
Dept: ENDOCRINOLOGY | Facility: CLINIC | Age: 63
End: 2023-03-27
Payer: MEDICARE

## 2023-03-27 NOTE — TELEPHONE ENCOUNTER
S/w pt wanted to know how long he needed it to be on first dose of Ozempic. Notified him 4 weeks. Pt verb understanding and will let us know when is time for us to send the next dose to pharmacy

## 2023-03-27 NOTE — TELEPHONE ENCOUNTER
----- Message from Natacha Penn sent at 3/27/2023  9:50 AM CDT -----  Contact: patient  Type:  Needs Medical Advice    Who Called:  Patient     Would the patient rather a call back or a response via [facility]? Call     Best Call Back Number: [phone] (home)      Additional Information:  Patient would like to speak with the nurse in regards to an insulin question that he has.     Please call to advise

## 2023-04-10 ENCOUNTER — TELEPHONE (OUTPATIENT)
Dept: ENDOCRINOLOGY | Facility: CLINIC | Age: 63
End: 2023-04-10
Payer: MEDICARE

## 2023-04-10 NOTE — TELEPHONE ENCOUNTER
S/w pt wanted to know to which dosage he needed to increase his ozempic. He states he got refills. Notified him that per Josh' last note if after 4 weeks of the lower dose and no side effects, to increase it to 0.5.for 4 weeks Pt verb understanding

## 2023-06-29 ENCOUNTER — TELEPHONE (OUTPATIENT)
Dept: ENDOCRINOLOGY | Facility: CLINIC | Age: 63
End: 2023-06-29
Payer: MEDICARE

## 2023-07-31 DIAGNOSIS — E11.65 TYPE 2 DIABETES MELLITUS WITH HYPERGLYCEMIA, WITHOUT LONG-TERM CURRENT USE OF INSULIN: ICD-10-CM

## 2023-07-31 RX ORDER — METFORMIN HYDROCHLORIDE 1000 MG/1
TABLET ORAL
Qty: 180 TABLET | Refills: 0 | Status: SHIPPED | OUTPATIENT
Start: 2023-07-31 | End: 2023-08-01 | Stop reason: SDUPTHER

## 2023-07-31 NOTE — TELEPHONE ENCOUNTER
----- Message from Ana Hutchinson sent at 7/31/2023 12:12 PM CDT -----  Type: Needs Medical Advice  Who Called:  pt       Best Call Back Number: [phone]    Additional Information: pt requesting call back in regards to his medication , says pharm wont refill script please advise

## 2023-07-31 NOTE — TELEPHONE ENCOUNTER
Spoke to pt and adv refill req has already been sent to [facility] for approval. Pt also req virtual visit. Scheduled appt, adv date and time.

## 2023-08-01 ENCOUNTER — TELEPHONE (OUTPATIENT)
Dept: ENDOCRINOLOGY | Facility: CLINIC | Age: 63
End: 2023-08-01

## 2023-08-01 ENCOUNTER — OFFICE VISIT (OUTPATIENT)
Dept: ENDOCRINOLOGY | Facility: CLINIC | Age: 63
End: 2023-08-01
Payer: MEDICARE

## 2023-08-01 DIAGNOSIS — Z86.73 H/O: CVA (CEREBROVASCULAR ACCIDENT): ICD-10-CM

## 2023-08-01 DIAGNOSIS — I10 ESSENTIAL HYPERTENSION: ICD-10-CM

## 2023-08-01 DIAGNOSIS — E78.5 HYPERLIPIDEMIA, UNSPECIFIED HYPERLIPIDEMIA TYPE: ICD-10-CM

## 2023-08-01 DIAGNOSIS — I25.10 CORONARY ARTERY DISEASE INVOLVING NATIVE CORONARY ARTERY OF NATIVE HEART WITHOUT ANGINA PECTORIS: ICD-10-CM

## 2023-08-01 DIAGNOSIS — E11.65 TYPE 2 DIABETES MELLITUS WITH HYPERGLYCEMIA, WITHOUT LONG-TERM CURRENT USE OF INSULIN: Primary | ICD-10-CM

## 2023-08-01 DIAGNOSIS — Z79.4 TYPE 2 DIABETES MELLITUS WITH DIABETIC NEPHROPATHY, WITH LONG-TERM CURRENT USE OF INSULIN: ICD-10-CM

## 2023-08-01 DIAGNOSIS — E11.21 TYPE 2 DIABETES MELLITUS WITH DIABETIC NEPHROPATHY, WITH LONG-TERM CURRENT USE OF INSULIN: ICD-10-CM

## 2023-08-01 PROCEDURE — 3066F PR DOCUMENTATION OF TREATMENT FOR NEPHROPATHY: ICD-10-PCS | Mod: CPTII,95,, | Performed by: NURSE PRACTITIONER

## 2023-08-01 PROCEDURE — 1160F PR REVIEW ALL MEDS BY PRESCRIBER/CLIN PHARMACIST DOCUMENTED: ICD-10-PCS | Mod: CPTII,95,, | Performed by: NURSE PRACTITIONER

## 2023-08-01 PROCEDURE — 1159F MED LIST DOCD IN RCRD: CPT | Mod: CPTII,95,, | Performed by: NURSE PRACTITIONER

## 2023-08-01 PROCEDURE — 1160F RVW MEDS BY RX/DR IN RCRD: CPT | Mod: CPTII,95,, | Performed by: NURSE PRACTITIONER

## 2023-08-01 PROCEDURE — 3061F NEG MICROALBUMINURIA REV: CPT | Mod: CPTII,95,, | Performed by: NURSE PRACTITIONER

## 2023-08-01 PROCEDURE — 99214 OFFICE O/P EST MOD 30 MIN: CPT | Mod: 95,,, | Performed by: NURSE PRACTITIONER

## 2023-08-01 PROCEDURE — 1159F PR MEDICATION LIST DOCUMENTED IN MEDICAL RECORD: ICD-10-PCS | Mod: CPTII,95,, | Performed by: NURSE PRACTITIONER

## 2023-08-01 PROCEDURE — 3046F HEMOGLOBIN A1C LEVEL >9.0%: CPT | Mod: CPTII,95,, | Performed by: NURSE PRACTITIONER

## 2023-08-01 PROCEDURE — 3046F PR MOST RECENT HEMOGLOBIN A1C LEVEL > 9.0%: ICD-10-PCS | Mod: CPTII,95,, | Performed by: NURSE PRACTITIONER

## 2023-08-01 PROCEDURE — 3066F NEPHROPATHY DOC TX: CPT | Mod: CPTII,95,, | Performed by: NURSE PRACTITIONER

## 2023-08-01 PROCEDURE — 4010F ACE/ARB THERAPY RXD/TAKEN: CPT | Mod: CPTII,95,, | Performed by: NURSE PRACTITIONER

## 2023-08-01 PROCEDURE — 3061F PR NEG MICROALBUMINURIA RESULT DOCUMENTED/REVIEW: ICD-10-PCS | Mod: CPTII,95,, | Performed by: NURSE PRACTITIONER

## 2023-08-01 PROCEDURE — 99214 PR OFFICE/OUTPT VISIT, EST, LEVL IV, 30-39 MIN: ICD-10-PCS | Mod: 95,,, | Performed by: NURSE PRACTITIONER

## 2023-08-01 PROCEDURE — 4010F PR ACE/ARB THEARPY RXD/TAKEN: ICD-10-PCS | Mod: CPTII,95,, | Performed by: NURSE PRACTITIONER

## 2023-08-01 RX ORDER — INSULIN DEGLUDEC 100 U/ML
INJECTION, SOLUTION SUBCUTANEOUS
Qty: 30 ML | Refills: 6 | Status: SHIPPED | OUTPATIENT
Start: 2023-08-01 | End: 2024-02-12 | Stop reason: SDUPTHER

## 2023-08-01 RX ORDER — TIRZEPATIDE 5 MG/.5ML
5 INJECTION, SOLUTION SUBCUTANEOUS
Qty: 4 PEN | Refills: 6 | Status: SHIPPED | OUTPATIENT
Start: 2023-08-01 | End: 2023-09-11 | Stop reason: SDUPTHER

## 2023-08-01 RX ORDER — INSULIN ASPART 100 [IU]/ML
INJECTION, SOLUTION INTRAVENOUS; SUBCUTANEOUS
Qty: 30 ML | Refills: 6 | Status: SHIPPED | OUTPATIENT
Start: 2023-08-01 | End: 2024-01-04 | Stop reason: SDUPTHER

## 2023-08-01 RX ORDER — METFORMIN HYDROCHLORIDE 1000 MG/1
1000 TABLET ORAL 2 TIMES DAILY WITH MEALS
Qty: 180 TABLET | Refills: 3 | Status: SHIPPED | OUTPATIENT
Start: 2023-08-01

## 2023-08-01 NOTE — PATIENT INSTRUCTIONS
Stop Ozempic.    Start Mounjaro 5 mg once weekly. This is a single use pen.    Increase Tresiba to 60 units.     Continue Novolog 20 units before your meals.     Continue metformin 1000 mg twice daily.

## 2023-08-01 NOTE — PROGRESS NOTES
The patient location is: [place]  The chief complaint leading to consultation is: Type 2 DM  Visit type: Virtual visit with synchronous audio and video  Total time spent with patient: 25 min  Each patient to whom he or she provides medical services by telemedicine is:  (1) informed of the relationship between the physician and patient and the respective role of any other health care provider with respect to management of the patient; and (2) notified that he or she may decline to receive medical services by telemedicine and may withdraw from such care at any time.       CC: Mr. Daniel Rodriguez arrives today for management of Type 2 DM and review of chronic medical conditions, as listed in the Visit Diagnosis section of this encounter.       HPI: Mr. Daniel Rodriguez was diagnosed with Type 2 DM in his 40s. Initial treatment consisted of orals. Unknown FH of DM, as he is adopted. Denies hospitalizations due to DM.   His cardiologist is Dr. Zuniga. He had CABG x 6 in ~ 2010.   H/o CVA.    Patient was last seen by me in January. Dexcom G6 was ordered and it was recommended that he start Ozempic.    Patient is hard of hearing, which makes this interview difficult.     He states that he cut out bread.     BG readings are checked 3x/day. Poor historian with BG recall. Does report some readings in 280-300s. He received Dexcom from [facility] and plans to have his family member help him start it. Patient has transportation issues so would prefer this over coming in to meet with educator.    Hypoglycemia: Rare, but did feel symptomatic recently. Did not do fingerstick. Ate snack and felt better.     Missing Insulin/PO medication doses: denies     Exercise: does home exercise daily    Dietary Habits: Eats 1-2 meal/day.  Avoids sugary beverages.     Last DM education appointment: unknown    He reports some leg swelling. Tries to elevate them when he can. His sister is trying to get him established with PCP in [place].       CURRENT  DIABETIC MEDS: metformin 1000 mg BID, Ozempic 0.5 mg weekly, Tresiba 56 units QHS, Novolog 20 units with meals.  Glucometer type: True Metrix    Previous DM treatments:  Starlix?  Glyburide     Last Eye Exam: 2022, [facility]. [facility]   Last Podiatry Exam: n/a    REVIEW OF SYSTEMS  Constitutional: no c/o fatigue, weakness, weight loss. + weight gain  Cardiac: no palpitations or chest pain.  Respiratory: no cough or dyspnea.  GI: no c/o abdominal pain or nausea. Denies h/o pancreatitis.   Skin: no lesions or rashes.  Neuro: + numbness in feet   Endocrine: denies polyphagia, polyuria, polydipsia      Personally reviewed Past Medical, Surgical, Social History.    Vital Signs  There were no vitals taken for this visit. -- video visit    Personally reviewed the below labs:    Hemoglobin A1C   Date Value Ref Range Status   01/20/2023 12.2 (H) 0.0 - 5.6 % Final     Comment:     Reference Interval:  5.0 - 5.6 Normal   5.7 - 6.4 High Risk   > 6.5 Diabetic      Hgb A1c results are standardized based on the (NGSP) National   Glycohemoglobin Standardization Program.      Hemoglobin A1C levels are related to mean serum/plasma glucose   during the preceding 2-3 months.        02/10/2021 11.3 (H) 4.0 - 5.6 % Final     Comment:     ADA Screening Guidelines:  5.7-6.4%  Consistent with prediabetes  >or=6.5%  Consistent with diabetes    High levels of fetal hemoglobin interfere with the HbA1C  assay. Heterozygous hemoglobin variants (HbS, HgC, etc)do  not significantly interfere with this assay.   However, presence of multiple variants may affect accuracy.     10/14/2019 10.6 (H) 4.0 - 5.6 % Final     Comment:     ADA Screening Guidelines:  5.7-6.4%  Consistent with prediabetes  >or=6.5%  Consistent with diabetes  High levels of fetal hemoglobin interfere with the HbA1C  assay. Heterozygous hemoglobin variants (HbS, HgC, etc)do  not significantly interfere with this assay.   However, presence of multiple variants may affect  "accuracy.         Chemistry        Component Value Date/Time     01/20/2023 0829    K 4.0 01/20/2023 0829     01/20/2023 0829    CO2 33 (H) 01/20/2023 0829    BUN 18 01/20/2023 0829    CREATININE 0.63 01/20/2023 0829     (H) 01/20/2023 0829        Component Value Date/Time    CALCIUM 8.9 01/20/2023 0829    ALKPHOS 148 (H) 01/20/2023 0829    AST 22 01/20/2023 0829    ALT 15 01/20/2023 0829    BILITOT 0.6 01/20/2023 0829    ESTGFRAFRICA >60.0 02/10/2021 0811    EGFRNONAA >60.0 02/10/2021 0811          Lab Results   Component Value Date    CHOL 141 01/20/2023    CHOL 97 (L) 02/10/2021     Lab Results   Component Value Date    HDL 29 (L) 01/20/2023    HDL 23 (L) 02/10/2021     Lab Results   Component Value Date    LDLCALC 78.6 01/20/2023    LDLCALC 54.4 (L) 02/10/2021     Lab Results   Component Value Date    TRIG 167 (H) 01/20/2023    TRIG 98 02/10/2021     Lab Results   Component Value Date    CHOLHDL 20.6 01/20/2023    CHOLHDL 23.7 02/10/2021       Lab Results   Component Value Date    MICALBCREAT Unable to calculate 01/20/2023     Lab Results   Component Value Date    TSH 0.732 10/14/2019       CrCl cannot be calculated (Patient's most recent lab result is older than the maximum 7 days allowed.).    No results found for: "SCLMCKWB95PI"    PHYSICAL EXAMINATION  Deferred - video visit       Goals        HEMOGLOBIN A1C < 7.5                 Assessment/Plan  1. Type 2 diabetes mellitus with hyperglycemia, without long-term current use of insulin  -- Chronically and significantly uncontrolled. He states that family member will come over and help set up Dexcom.   -- A1c, CMP this week  -- stop Ozempic   -- start Mounjaro 5 mg once weekly. Discussed medication's mechanism of action, side effects, and contraindications. Advised pt to notify me for extreme nausea, abdominal pain, etc.  -- continue Novolog 20 units AC.   -- increase Tresiba to 60 units.   -- continue metformin    -- Patient currently " manages Type 2 diabetes mellitus with an intensive insulin regimen consisting of 4 insulin injections/day. The patient has been using SMBG for frequent glucose monitoring (will be checking 4+ times per day as of today). The patient requires a therapeutic CGM and is willing to use the CGM for the necessary frequent self-adjustments of insulin therapy. This is a medical necessity. I will continue to have in-person visits every 3 months to assess adherence to his CGM regimen and diabetes treatment plan.     -- Discussed diagnosis of DM, A1c goals, progression of disease, long term complications and tx options.  -- Reviewed hypoglycemia management: treat with 1/2 glass of juice, 1/2 can regular coke, or 4 glucose tablets. Monitor and repeat treatment every 15 minutes until BG is >70 Then have a snack, which includes a complex carbohydrate and protein.     -- takes statin, ace-I, aspirin     2. Type 2 diabetes mellitus with diabetic nephropathy, with long-term current use of insulin -- optimize DM control  -- continue lisinopril   3. Coronary artery disease involving native coronary artery of native heart without angina pectoris  -- avoid hypoglycemia   -- follows with cardiology   4. Essential hypertension  -- continue current meds   -- managed by cardiology   5. Hyperlipidemia, unspecified hyperlipidemia type  -- uncontrolled with mildly elevated TRG  -- optimize glycemic control  -- continue atorvastatin   6. H/O: CVA (cerebrovascular accident)  -- avoid hypoglycemia         FOLLOW UP  Follow up in about 3 months (around 11/1/2023).   Patient instructed to bring BG logs to each follow up   Patient encouraged to call for any BG/medication issues, concerns, or questions.    Orders Placed This Encounter   Procedures    Hemoglobin A1C    Basic Metabolic Panel     A1c, CMP this week in [address], BMP with RTC  Establish with PCP in [place].

## 2023-08-03 ENCOUNTER — TELEPHONE (OUTPATIENT)
Dept: ENDOCRINOLOGY | Facility: CLINIC | Age: 63
End: 2023-08-03
Payer: MEDICARE

## 2023-08-03 NOTE — TELEPHONE ENCOUNTER
----- Message from Pastora Erwin sent at 8/3/2023 12:46 PM CDT -----  Contact: self  Type: Needs Medical Advice  Who Called:  Patient    Best Call Back Number: [phone]    Additional Information: Pt states he would like to speak with office regarding sugar levels says its dropping right now is 83.Please call back

## 2023-08-03 NOTE — TELEPHONE ENCOUNTER
S/w pt states his BG was dropping at 83 and sweaty. Pt does admit he was in a very hot room and was probably a little overheated. Advised pt 83 is normal glucose but to monitor for BG below 70 and to treat with 4 oz of regular soda, juice, etc. And to recheck BG within 15 minutes and continue to treat until BG is within normal range. Verbalized understanding.

## 2023-09-11 DIAGNOSIS — E11.65 TYPE 2 DIABETES MELLITUS WITH HYPERGLYCEMIA, WITHOUT LONG-TERM CURRENT USE OF INSULIN: ICD-10-CM

## 2023-09-11 RX ORDER — TIRZEPATIDE 5 MG/.5ML
5 INJECTION, SOLUTION SUBCUTANEOUS
Qty: 4 PEN | Refills: 6 | Status: SHIPPED | OUTPATIENT
Start: 2023-09-11

## 2023-09-11 NOTE — TELEPHONE ENCOUNTER
Left message for patient to call back. Not sure which insulin he is referring to bit I'm thinking is Moujanro [facility] has the Mounjaro 5mg.They said just came in today

## 2023-09-11 NOTE — TELEPHONE ENCOUNTER
----- Message from Mookie Ascencio sent at 9/11/2023  9:19 AM CDT -----  Type: Needs Medical Advice  Who Called: pt  Best Call Back Number: [phone]  Additional Information:  pt is calling the office to speak with the provider in regards to her insulin Rx. It is out of stock at her pharmacy and she cannot find it anywhere. Please call back to advise Thanks!

## 2023-09-11 NOTE — TELEPHONE ENCOUNTER
S/w pt states out of Mounjaro and no pharmacy has it. [facility] does have the 5mg    Pt states would like Mounjaro 5mg send to [facility] to be mail to him

## 2023-10-24 ENCOUNTER — TELEPHONE (OUTPATIENT)
Dept: ENDOCRINOLOGY | Facility: CLINIC | Age: 63
End: 2023-10-24
Payer: MEDICARE

## 2023-11-02 ENCOUNTER — OFFICE VISIT (OUTPATIENT)
Dept: ENDOCRINOLOGY | Facility: CLINIC | Age: 63
End: 2023-11-02
Payer: MEDICARE

## 2023-11-02 DIAGNOSIS — I10 ESSENTIAL HYPERTENSION: ICD-10-CM

## 2023-11-02 DIAGNOSIS — Z86.73 H/O: CVA (CEREBROVASCULAR ACCIDENT): ICD-10-CM

## 2023-11-02 DIAGNOSIS — I25.10 CORONARY ARTERY DISEASE INVOLVING NATIVE CORONARY ARTERY OF NATIVE HEART WITHOUT ANGINA PECTORIS: ICD-10-CM

## 2023-11-02 DIAGNOSIS — Z79.4 TYPE 2 DIABETES MELLITUS WITH DIABETIC NEPHROPATHY, WITH LONG-TERM CURRENT USE OF INSULIN: ICD-10-CM

## 2023-11-02 DIAGNOSIS — E78.5 HYPERLIPIDEMIA, UNSPECIFIED HYPERLIPIDEMIA TYPE: ICD-10-CM

## 2023-11-02 DIAGNOSIS — E11.65 TYPE 2 DIABETES MELLITUS WITH HYPERGLYCEMIA, WITHOUT LONG-TERM CURRENT USE OF INSULIN: Primary | ICD-10-CM

## 2023-11-02 DIAGNOSIS — E11.21 TYPE 2 DIABETES MELLITUS WITH DIABETIC NEPHROPATHY, WITH LONG-TERM CURRENT USE OF INSULIN: ICD-10-CM

## 2023-11-02 DIAGNOSIS — J01.00 ACUTE NON-RECURRENT MAXILLARY SINUSITIS: ICD-10-CM

## 2023-11-02 PROCEDURE — 3061F PR NEG MICROALBUMINURIA RESULT DOCUMENTED/REVIEW: ICD-10-PCS | Mod: CPTII,95,, | Performed by: NURSE PRACTITIONER

## 2023-11-02 PROCEDURE — 1160F RVW MEDS BY RX/DR IN RCRD: CPT | Mod: CPTII,95,, | Performed by: NURSE PRACTITIONER

## 2023-11-02 PROCEDURE — 99214 PR OFFICE/OUTPT VISIT, EST, LEVL IV, 30-39 MIN: ICD-10-PCS | Mod: 95,,, | Performed by: NURSE PRACTITIONER

## 2023-11-02 PROCEDURE — 3046F HEMOGLOBIN A1C LEVEL >9.0%: CPT | Mod: CPTII,95,, | Performed by: NURSE PRACTITIONER

## 2023-11-02 PROCEDURE — 3066F NEPHROPATHY DOC TX: CPT | Mod: CPTII,95,, | Performed by: NURSE PRACTITIONER

## 2023-11-02 PROCEDURE — 1159F MED LIST DOCD IN RCRD: CPT | Mod: CPTII,95,, | Performed by: NURSE PRACTITIONER

## 2023-11-02 PROCEDURE — 4010F PR ACE/ARB THEARPY RXD/TAKEN: ICD-10-PCS | Mod: CPTII,95,, | Performed by: NURSE PRACTITIONER

## 2023-11-02 PROCEDURE — 99214 OFFICE O/P EST MOD 30 MIN: CPT | Mod: 95,,, | Performed by: NURSE PRACTITIONER

## 2023-11-02 PROCEDURE — 1159F PR MEDICATION LIST DOCUMENTED IN MEDICAL RECORD: ICD-10-PCS | Mod: CPTII,95,, | Performed by: NURSE PRACTITIONER

## 2023-11-02 PROCEDURE — 3066F PR DOCUMENTATION OF TREATMENT FOR NEPHROPATHY: ICD-10-PCS | Mod: CPTII,95,, | Performed by: NURSE PRACTITIONER

## 2023-11-02 PROCEDURE — 3061F NEG MICROALBUMINURIA REV: CPT | Mod: CPTII,95,, | Performed by: NURSE PRACTITIONER

## 2023-11-02 PROCEDURE — 4010F ACE/ARB THERAPY RXD/TAKEN: CPT | Mod: CPTII,95,, | Performed by: NURSE PRACTITIONER

## 2023-11-02 PROCEDURE — 3046F PR MOST RECENT HEMOGLOBIN A1C LEVEL > 9.0%: ICD-10-PCS | Mod: CPTII,95,, | Performed by: NURSE PRACTITIONER

## 2023-11-02 PROCEDURE — 1160F PR REVIEW ALL MEDS BY PRESCRIBER/CLIN PHARMACIST DOCUMENTED: ICD-10-PCS | Mod: CPTII,95,, | Performed by: NURSE PRACTITIONER

## 2023-11-02 RX ORDER — AMOXICILLIN AND CLAVULANATE POTASSIUM 875; 125 MG/1; MG/1
1 TABLET, FILM COATED ORAL EVERY 12 HOURS
Qty: 14 TABLET | Refills: 0 | Status: SHIPPED | OUTPATIENT
Start: 2023-11-02 | End: 2023-11-09

## 2023-11-02 NOTE — PROGRESS NOTES
"The patient location is: [place]  The chief complaint leading to consultation is: Type 2 DM  Visit type: Virtual visit with synchronous audio and video  Total time spent with patient: 25 min  Each patient to whom he or she provides medical services by telemedicine is:  (1) informed of the relationship between the physician and patient and the respective role of any other health care provider with respect to management of the patient; and (2) notified that he or she may decline to receive medical services by telemedicine and may withdraw from such care at any time.       CC: Mr. Daniel Rodriguez arrives today for management of Type 2 DM and review of chronic medical conditions, as listed in the Visit Diagnosis section of this encounter.       HPI: Mr. Daniel Rodriguez was diagnosed with Type 2 DM in his 40s. Initial treatment consisted of orals. Unknown FH of DM, as he is adopted. Denies hospitalizations due to DM.   His cardiologist is Dr. Zuniga. He had CABG x 6 in ~ 2010.   H/o CVA.    Patient was last seen by me in August.  Ozempic was changed to Mounjaro.     He states that he gave last dose of Mounjaro on Monday. He is out of refills. He states that blood sugars have decreased and he has lost weight.     He missed his lab work so doesn't have updated A1c.     Patient's primary concern today is sinus infection. He states that the facial pain is interfering with his sleep. He states that he is "miserable." Reports facial pain and swelling. Symptoms started 4 days ago. He is asking for an antibiotic.     BG readings are checked 3x/day. Poor historian with BG recall. Reports most readings less than 200.     Hypoglycemia: reports some readings around 80.     Missing Insulin/PO medication doses: denies     Dietary Habits: Eats 2 meals/day.  Avoids sugary beverages.     Last DM education appointment: unknown    His sister is trying to get him established with PCP in [place]. He recently missed an appointment. "         CURRENT DIABETIC MEDS: metformin 1000 mg BID, Mounjaro 5 mg weekly, Tresiba 60 units QHS, Novolog 20 units with meals.  Glucometer type: True Metrix    Previous DM treatments:  Starlix?  Glyburide   Ozempic    Last Eye Exam: 2022, [facility] provider. [facility]   Last Podiatry Exam: n/a    REVIEW OF SYSTEMS  Constitutional: no c/o fatigue, weakness. + weight loss.  Cardiac: no palpitations or chest pain.  ENT: reports facial pain that he attributes to sinus infection. Denies fever.   Respiratory: no dyspnea. + cough.  GI: no c/o abdominal pain or nausea. Denies h/o pancreatitis.   Skin: no lesions or rashes.  Neuro: + numbness in feet   Endocrine: denies polyphagia, polyuria, polydipsia      Personally reviewed Past Medical, Surgical, Social History.    Vital Signs  There were no vitals taken for this visit. -- video visit    Personally reviewed the below labs:    Hemoglobin A1C   Date Value Ref Range Status   01/20/2023 12.2 (H) 0.0 - 5.6 % Final     Comment:     Reference Interval:  5.0 - 5.6 Normal   5.7 - 6.4 High Risk   > 6.5 Diabetic      Hgb A1c results are standardized based on the (NGSP) National   Glycohemoglobin Standardization Program.      Hemoglobin A1C levels are related to mean serum/plasma glucose   during the preceding 2-3 months.        02/10/2021 11.3 (H) 4.0 - 5.6 % Final     Comment:     ADA Screening Guidelines:  5.7-6.4%  Consistent with prediabetes  >or=6.5%  Consistent with diabetes    High levels of fetal hemoglobin interfere with the HbA1C  assay. Heterozygous hemoglobin variants (HbS, HgC, etc)do  not significantly interfere with this assay.   However, presence of multiple variants may affect accuracy.     10/14/2019 10.6 (H) 4.0 - 5.6 % Final     Comment:     ADA Screening Guidelines:  5.7-6.4%  Consistent with prediabetes  >or=6.5%  Consistent with diabetes  High levels of fetal hemoglobin interfere with the HbA1C  assay. Heterozygous hemoglobin variants (HbS, HgC, etc)do  not  "significantly interfere with this assay.   However, presence of multiple variants may affect accuracy.         Chemistry        Component Value Date/Time     01/20/2023 0829    K 4.0 01/20/2023 0829     01/20/2023 0829    CO2 33 (H) 01/20/2023 0829    BUN 18 01/20/2023 0829    CREATININE 0.63 01/20/2023 0829     (H) 01/20/2023 0829        Component Value Date/Time    CALCIUM 8.9 01/20/2023 0829    ALKPHOS 148 (H) 01/20/2023 0829    AST 22 01/20/2023 0829    ALT 15 01/20/2023 0829    BILITOT 0.6 01/20/2023 0829    ESTGFRAFRICA >60.0 02/10/2021 0811    EGFRNONAA >60.0 02/10/2021 0811          Lab Results   Component Value Date    CHOL 141 01/20/2023    CHOL 97 (L) 02/10/2021     Lab Results   Component Value Date    HDL 29 (L) 01/20/2023    HDL 23 (L) 02/10/2021     Lab Results   Component Value Date    LDLCALC 78.6 01/20/2023    LDLCALC 54.4 (L) 02/10/2021     Lab Results   Component Value Date    TRIG 167 (H) 01/20/2023    TRIG 98 02/10/2021     Lab Results   Component Value Date    CHOLHDL 20.6 01/20/2023    CHOLHDL 23.7 02/10/2021       Lab Results   Component Value Date    MICALBCREAT Unable to calculate 01/20/2023     Lab Results   Component Value Date    TSH 0.732 10/14/2019       CrCl cannot be calculated (Patient's most recent lab result is older than the maximum 7 days allowed.).    No results found for: "[id number]"    PHYSICAL EXAMINATION  Deferred - video visit       Goals        HEMOGLOBIN A1C < 7.5                 Assessment/Plan  1. Type 2 diabetes mellitus with hyperglycemia, without long-term current use of insulin  -- Reported glucoses are reasonable. Advised him to have A1c drawn next week. He prefers [facility].  -- spoke with pharmacy. They will be able to ship Mounjaro on Monday.   -- continue insulins at current doses.   -- continue metformin  -- BG monitoring 4x/day    -- Discussed diagnosis of DM, A1c goals, progression of disease, long term complications and tx " options.  -- Reviewed hypoglycemia management: treat with 1/2 glass of juice, 1/2 can regular coke, or 4 glucose tablets. Monitor and repeat treatment every 15 minutes until BG is >70 Then have a snack, which includes a complex carbohydrate and protein.     -- takes statin, ace-I, aspirin     2. Type 2 diabetes mellitus with diabetic nephropathy, with long-term current use of insulin -- optimize DM control  -- continue lisinopril   3. Coronary artery disease involving native coronary artery of native heart without angina pectoris  -- avoid hypoglycemia   -- follows with cardiology   4. Essential hypertension  -- continue current meds   -- managed by cardiology   5. Hyperlipidemia, unspecified hyperlipidemia type  -- uncontrolled with mildly elevated TRG  -- optimize glycemic control  -- continue atorvastatin   6. H/O: CVA (cerebrovascular accident)  -- avoid hypoglycemia   7. Acute non-recurrent maxillary sinusitis -- amoxicillin-clavulanate 875-125mg (AUGMENTIN) 875-125 mg per tablet. Take twice daily for 7 days if symptoms don't improve over the weekend.          FOLLOW UP  Follow up in about 4 months (around 3/2/2024).   Patient instructed to bring BG logs to each follow up   Patient encouraged to call for any BG/medication issues, concerns, or questions.    Orders Placed This Encounter   Procedures    Hemoglobin A1C    Lipid Panel    Comprehensive Metabolic Panel    Microalbumin/Creatinine Ratio, Urine    TSH

## 2024-01-03 DIAGNOSIS — E11.65 TYPE 2 DIABETES MELLITUS WITH HYPERGLYCEMIA, WITHOUT LONG-TERM CURRENT USE OF INSULIN: ICD-10-CM

## 2024-01-03 NOTE — TELEPHONE ENCOUNTER
----- Message from Maryann Lola sent at 1/3/2024  3:33 PM CST -----  Regarding: refill  Contact: patient  Type:  RX Refill Request    Who Called:  patient  Refill or New Rx:  refill  RX Name and Strength:  tirzepatide (MOUNJARO) 5 mg/0.5 mL PnIj  How is the patient currently taking it? (ex. 1XDay):  as directed  Is this a 30 day or 90 day RX:  90  Preferred Pharmacy with phone number:   [facility] [address] [address]  [address] [address] [address]  Phone: [phone] Fax: [phone]    Local or Mail Order:  local  Ordering Provider:  Kailey Marina  Best Call Back Number:  [phone] (home)     Additional Information:  Patient is out of medication.  Please call patient to advise.  Thanks!

## 2024-01-03 NOTE — TELEPHONE ENCOUNTER
----- Message from Amber Luong sent at 1/3/2024  3:44 PM CST -----  Contact: self  Type:  Patient Returning Call    Who Called:  pt  Who Left Message for Patient:  breezy  Does the patient know what this is regarding?:  yes  Best Call Back Number:  [phone]   Additional Information:  please call

## 2024-01-03 NOTE — TELEPHONE ENCOUNTER
S/w pt states his BG have been in the 400's. States has not gotten the Mounjaro for last 3 weeks.I called pharmacy and they have it in b/o and don't know when they'll get more. Pt states taking 63 units of the tresiba, 30 units of novolog 3 times a daily before meals and metformin BID

## 2024-01-04 DIAGNOSIS — E11.65 TYPE 2 DIABETES MELLITUS WITH HYPERGLYCEMIA, WITHOUT LONG-TERM CURRENT USE OF INSULIN: ICD-10-CM

## 2024-01-04 RX ORDER — INSULIN ASPART 100 [IU]/ML
INJECTION, SOLUTION INTRAVENOUS; SUBCUTANEOUS
Qty: 30 ML | Refills: 6 | Status: SHIPPED | OUTPATIENT
Start: 2024-01-04 | End: 2024-02-12 | Stop reason: SDUPTHER

## 2024-01-04 RX ORDER — TIRZEPATIDE 5 MG/.5ML
5 INJECTION, SOLUTION SUBCUTANEOUS
Qty: 12 PEN | Refills: 1 | Status: SHIPPED | OUTPATIENT
Start: 2024-01-04

## 2024-01-04 RX ORDER — TIRZEPATIDE 5 MG/.5ML
5 INJECTION, SOLUTION SUBCUTANEOUS
Qty: 12 PEN | Refills: 1 | Status: SHIPPED | OUTPATIENT
Start: 2024-01-04 | End: 2024-01-04 | Stop reason: SDUPTHER

## 2024-01-04 NOTE — TELEPHONE ENCOUNTER
Reviewed chart. Ledy sent rx in today for Mounjaro 5 mg to [facility]. Please call pharmacy and ask if they have this in stock. If not, which Mounjaro doses do they have in stock?

## 2024-01-04 NOTE — TELEPHONE ENCOUNTER
----- Message from Deni Lyman sent at 1/4/2024 12:23 PM CST -----  Contact: pt  Type:  Needs Medical Advice    Who Called: the patient  Symptoms (please be specific):  How long has patient had these symptoms:    Pharmacy name and phone #:    [facility] - [place] - [address]  [address]  [place] [address]  Phone: [phone] Fax: [phone]        Would the patient rather a call back or a response via [facility]? call back/  Best Call Back Number:   Additional Information: Pt states insulin insulin aspart U-100 (NOVOLOG FLEXPEN U-100 INSULIN) 100 unit/mL (3 mL) InPn pen is out of stock and he needs a replacement    please advise   Thanks

## 2024-01-04 NOTE — TELEPHONE ENCOUNTER
----- Message from Umm Pickering sent at 1/4/2024  7:13 AM CST -----  Type: Needs Medical Advice  Who Called:  patient  Best Call Back Number: [phone] (home)   Additional Information: patient just got out of the hospital- he needs his mounjuro mailed to him- he has not had his shot in 3 weeks.  please advise

## 2024-01-04 NOTE — TELEPHONE ENCOUNTER
Kailey this is the pt that [facility] Mounjro is in b/o and his local pharmacy does not have it. States his BG have been all over the place. Doing novolog 30units and tresiba 63units

## 2024-02-12 DIAGNOSIS — E11.65 TYPE 2 DIABETES MELLITUS WITH HYPERGLYCEMIA, WITHOUT LONG-TERM CURRENT USE OF INSULIN: ICD-10-CM

## 2024-02-12 RX ORDER — INSULIN DEGLUDEC 100 U/ML
INJECTION, SOLUTION SUBCUTANEOUS
Qty: 30 ML | Refills: 6 | Status: SHIPPED | OUTPATIENT
Start: 2024-02-12 | End: 2024-04-02 | Stop reason: SDUPTHER

## 2024-02-12 RX ORDER — INSULIN ASPART 100 [IU]/ML
INJECTION, SOLUTION INTRAVENOUS; SUBCUTANEOUS
Qty: 30 ML | Refills: 6 | Status: SHIPPED | OUTPATIENT
Start: 2024-02-12 | End: 2024-04-16 | Stop reason: SDUPTHER

## 2024-02-12 NOTE — TELEPHONE ENCOUNTER
----- Message from Malena Phillips sent at 2/12/2024 11:59 AM CST -----  Contact: Patient  Type:  Needs Medical Advice    Who Called: Patient    Pharmacy name and phone #:    [facility] [place] - [address]  [address]  [place] [address]  Phone: [phone] Fax: [phone]      Would the patient rather a call back or a response via [facility]? Call    Best Call Back Number: [phone] (home)     Additional Information: Patient states that pharmacy told him that Jarrot took him off of insulin aspart U-100 (NOVOLOG FLEXPEN U-100 INSULIN) 100 unit/mL (3 mL) InPn pen    Please call to advise

## 2024-04-01 DIAGNOSIS — E11.9 TYPE 2 DIABETES MELLITUS WITHOUT COMPLICATION, WITH LONG-TERM CURRENT USE OF INSULIN: ICD-10-CM

## 2024-04-01 DIAGNOSIS — E11.65 TYPE 2 DIABETES MELLITUS WITH HYPERGLYCEMIA, WITHOUT LONG-TERM CURRENT USE OF INSULIN: ICD-10-CM

## 2024-04-01 DIAGNOSIS — Z79.4 TYPE 2 DIABETES MELLITUS WITHOUT COMPLICATION, WITH LONG-TERM CURRENT USE OF INSULIN: ICD-10-CM

## 2024-04-02 RX ORDER — INSULIN DEGLUDEC 100 U/ML
INJECTION, SOLUTION SUBCUTANEOUS
Qty: 30 ML | Refills: 6 | Status: SHIPPED | OUTPATIENT
Start: 2024-04-02 | End: 2024-05-15 | Stop reason: SDUPTHER

## 2024-04-02 RX ORDER — TIRZEPATIDE 5 MG/.5ML
5 INJECTION, SOLUTION SUBCUTANEOUS
Qty: 12 PEN | Refills: 1 | Status: SHIPPED | OUTPATIENT
Start: 2024-04-02 | End: 2024-05-15

## 2024-04-02 RX ORDER — CALCIUM CITRATE/VITAMIN D3 200MG-6.25
TABLET ORAL
Qty: 100 EACH | Refills: 6 | Status: SHIPPED | OUTPATIENT
Start: 2024-04-02

## 2024-04-02 NOTE — TELEPHONE ENCOUNTER
----- Message from Maryann Davila sent at 4/1/2024  4:44 PM CDT -----  Regarding: refill  Contact: patient  Type:  RX Refill Request    Who Called:  patient  Refill or New Rx:  refill  RX Name and Strength:  blood sugar diagnostic (TRUE METRIX GLUCOSE TEST STRIP) Strp  insulin degludec (TRESIBA FLEXTOUCH U-100) 100 unit/mL (3 mL) insulin pen  tirzepatide (MOUNJARO) 5 mg/0.5 mL PnIj  How is the patient currently taking it? (ex. 1XDay):  as directed  Is this a 30 day or 90 day RX:  90  Preferred Pharmacy with phone number:    [facility]    Local or Mail Order:  local  Ordering Provider:  Kailey Marina  Best Call Back Number:  [phone] (home)     Additional Information:  Please call patient to advise.  Thanks!

## 2024-04-16 DIAGNOSIS — E11.65 TYPE 2 DIABETES MELLITUS WITH HYPERGLYCEMIA, WITHOUT LONG-TERM CURRENT USE OF INSULIN: ICD-10-CM

## 2024-04-16 RX ORDER — SEMAGLUTIDE 0.68 MG/ML
0.5 INJECTION, SOLUTION SUBCUTANEOUS
Qty: 3 ML | Refills: 6 | Status: SHIPPED | OUTPATIENT
Start: 2024-04-16 | End: 2024-05-15 | Stop reason: SDUPTHER

## 2024-04-16 RX ORDER — INSULIN ASPART 100 [IU]/ML
INJECTION, SOLUTION INTRAVENOUS; SUBCUTANEOUS
Qty: 30 ML | Refills: 6 | Status: SHIPPED | OUTPATIENT
Start: 2024-04-16 | End: 2024-06-19 | Stop reason: SDUPTHER

## 2024-04-16 RX ORDER — SEMAGLUTIDE 1.34 MG/ML
INJECTION, SOLUTION SUBCUTANEOUS
Qty: 9 ML | Refills: 3 | Status: CANCELLED | OUTPATIENT
Start: 2024-04-16

## 2024-04-16 NOTE — TELEPHONE ENCOUNTER
----- Message from Traci Mast sent at 4/16/2024  2:01 PM CDT -----  Regarding: Needs refill asap  Type: Needs Medical Advice  Who Called:  Pt    Pharmacy name and phone #:    [facility] [place] - [address]  [address]  [place] [address]  Phone: [phone] Fax: [phone]        Best Call Back Number: [phone]    Additional Information: Pt has still not been able to get his medication its been 3 weeks,Novalog and mounjaro

## 2024-04-16 NOTE — TELEPHONE ENCOUNTER
----- Message from Traci Mast sent at 4/16/2024  2:01 PM CDT -----  Regarding: Needs refill asap  Type: Needs Medical Advice  Who Called:  Pt    Pharmacy name and phone #:    [facility] [place] - [address]  [address]  [place] [address]  Phone: [phone] Fax: [phone]        Best Call Back Number: [phone]    Additional Information: Pt has still not been able to get his medication its been 3 weeks,Novalog and mounjaro

## 2024-04-16 NOTE — TELEPHONE ENCOUNTER
----- Message from Traci Mast sent at 4/16/2024  2:01 PM CDT -----  Regarding: Needs refill asap  Type: Needs Medical Advice  Who Called:  Pt    Pharmacy name and phone #:    [facility] - [place] - [address]  [address]  [place] [address]  Phone: [phone] Fax: [phone]        Best Call Back Number: [phone]    Additional Information: Pt has still not been able to get his medication its been 3 weeks,Novalog and mounjaro

## 2024-05-15 DIAGNOSIS — E11.65 TYPE 2 DIABETES MELLITUS WITH HYPERGLYCEMIA, WITHOUT LONG-TERM CURRENT USE OF INSULIN: ICD-10-CM

## 2024-05-15 RX ORDER — SEMAGLUTIDE 0.68 MG/ML
0.5 INJECTION, SOLUTION SUBCUTANEOUS
Qty: 3 ML | Refills: 6 | Status: SHIPPED | OUTPATIENT
Start: 2024-05-15 | End: 2024-06-19

## 2024-05-15 RX ORDER — INSULIN DEGLUDEC 100 U/ML
INJECTION, SOLUTION SUBCUTANEOUS
Qty: 30 ML | Refills: 6 | Status: SHIPPED | OUTPATIENT
Start: 2024-05-15 | End: 2024-06-19 | Stop reason: SDUPTHER

## 2024-05-15 NOTE — TELEPHONE ENCOUNTER
----- Message from Torin Tafoya sent at 5/15/2024  8:16 AM CDT -----  Regarding: refill  Type:  RX Refill Request    Who Called: pt    Refill or New Rx:refill    RX Name and Strength:semaglutide (OZEMPIC) 0.25 mg or 0.5 mg (2 mg/3 mL) pen injector 3 mL 6 4/16/2024 -   Sig - Route: Inject 0.5 mg into the skin every 7 days. - Subcutaneous   Sent to pharmacy as: semaglutide (OZEMPIC) 0.25 mg or 0.5 mg (2 mg/3 mL) pen injector   Notes to Pharmacy: PLEASE MAIL TO PATIENT     nsulin degludec (TRESIBA FLEXTOUCH U-100) 100 unit/mL (3 mL) insulin pen 30 mL 6 4/2/2024 -   Sig: INJECT 60 UNITS INTO THE SKIN DAILY.   Sent to pharmacy as: insulin degludec (TRESIBA FLEXTOUCH U-100) 100 unit/mL (3 mL) insulin pen   E-Prescribing Status: Receipt confirmed by pharmacy (4/2/2024 11:31 AM CDT)   Prior authorization: Denied     Notes to pharmacy: PLEASE MAIL TO PATIENT    How is the patient currently taking it? (ex. 1XDay):see above    Is this a 30 day or 90 day RX:see above    Preferred Pharmacy with phone number:      [facility] [address] [facility] [address] [place] [address]  Phone: [phone] Fax: [phone]          Local or Mail Order:mail    Ordering Provider:angelina Santiago Call Back Number:[phone]      Additional Information:  Please call to discuss.

## 2024-05-31 ENCOUNTER — TELEPHONE (OUTPATIENT)
Dept: ENDOCRINOLOGY | Facility: CLINIC | Age: 64
End: 2024-05-31
Payer: MEDICARE

## 2024-05-31 NOTE — TELEPHONE ENCOUNTER
S/w pt notified him per Kailey :    Decrease Novolog to 28 units and continue Tresiba 64 @ bedtime. Notified him also to make sure to eat a source of protein and a source of carbs in each meal.Pt verb understanding

## 2024-05-31 NOTE — TELEPHONE ENCOUNTER
"S/w pt and states  last Pm Bg 100's,this AM was 156(he has eaten cereal)he tooka nap and woke up hungry and his BG was 68.then,after eating "a bunch of things" it went up to 117. States taking tresiba 64 units before bedtime and Novolog 35 units before each meal, as well as ozempic.    Pt wants yo know anything should be changed and wants to know if he should take his tresiba tonight  "

## 2024-05-31 NOTE — TELEPHONE ENCOUNTER
----- Message from Chitra Evans sent at 5/31/2024  4:03 PM CDT -----  Regarding: low blood sugar  Type:  Needs Medical Advice     Who Called: pt called     Symptoms (please be specific): pt sugar is 68      How long has patient had these symptoms:  today     Pharmacy name and phone #:    [facility] [place] - [address]  [address]  [facility] [address]  Phone: [phone] Fax: [phone]      Would the patient rather a call back or a response via [facility]? Call back     Best Call Back Number: .mob    Additional Information: pt seem out of breath

## 2024-06-13 ENCOUNTER — TELEPHONE (OUTPATIENT)
Dept: ENDOCRINOLOGY | Facility: CLINIC | Age: 64
End: 2024-06-13
Payer: MEDICARE

## 2024-06-13 NOTE — TELEPHONE ENCOUNTER
----- Message from Jaguar Palacios sent at 6/12/2024  4:17 PM CDT -----  Regarding: refill  Contact: patient  Type:  RX Refill Request    Who Called: patient  Refill or New Rx:new order  RX Name and Strength:Mounjaro  How is the patient currently taking it? (ex. 1XDay):  Is this a 30 day or 90 day RX:  Preferred Pharmacy with phone number:    [facility] [address] [facility] [address] [place] [address]  Phone: [phone] Fax: [phone]    Local or Mail Order:local  Ordering Provider:Laina  Would the patient rather a call back or a response via [facility]? New order  Best Call Back Number:[phone]  Additional Information:

## 2024-06-18 DIAGNOSIS — E11.65 TYPE 2 DIABETES MELLITUS WITH HYPERGLYCEMIA, WITHOUT LONG-TERM CURRENT USE OF INSULIN: ICD-10-CM

## 2024-06-18 NOTE — TELEPHONE ENCOUNTER
----- Message from Grayson Trotter sent at 6/18/2024 11:42 AM CDT -----  Contact: self  Type: Needs Medical Advice  Who Called:  PT    Best Call Back Number: [phone]   Additional Information: Please call PT regarding his Monjurno he states he has been out of it for two weeks.  He said ozempic does not work as well.  He would like a call from the nurse to discuss.

## 2024-06-18 NOTE — TELEPHONE ENCOUNTER
Please ask pt for report of his recent blood sugars: fasting, before lunch, dinner, at bedtime. Also ask what dose of Ozempic he is taking. We can consider increasing the dose if having high blood sugars. Do not advise changing back to Mounjaro due to unreliable supply due to shortage.

## 2024-06-19 RX ORDER — INSULIN ASPART 100 [IU]/ML
INJECTION, SOLUTION INTRAVENOUS; SUBCUTANEOUS
Qty: 30 ML | Refills: 6 | Status: SHIPPED | OUTPATIENT
Start: 2024-06-19

## 2024-06-19 RX ORDER — INSULIN DEGLUDEC 100 U/ML
INJECTION, SOLUTION SUBCUTANEOUS
Qty: 30 ML | Refills: 6 | Status: SHIPPED | OUTPATIENT
Start: 2024-06-19

## 2024-06-19 RX ORDER — SEMAGLUTIDE 1.34 MG/ML
1 INJECTION, SOLUTION SUBCUTANEOUS
Qty: 9 ML | Refills: 3 | Status: SHIPPED | OUTPATIENT
Start: 2024-06-19 | End: 2025-06-19

## 2024-06-19 NOTE — TELEPHONE ENCOUNTER
S/w pt states his BG have been running between 115-230. Before lunch time last 4 days have been 129,149,115,226. Last night before bedtime 149    States needs new refill on ozempic,tresiba and novolog [facility] to [facility]

## 2024-06-19 NOTE — TELEPHONE ENCOUNTER
----- Message from Deanna Rodriguez sent at 6/19/2024  7:37 AM CDT -----  Contact: self  Type:  Needs Medical Advice    Who Called: self  Symptoms (please be specific): pt is requesting mounjaro, please call and discuss. Pt is upset that no one called. Pt has been out for two weeks.  Would the patient rather a call back or a response via [facility]? call  Best Call Back Number: [phone] (home)     Additional Information: please advise and thank

## 2024-06-19 NOTE — TELEPHONE ENCOUNTER
Spoke with patient and notified her of Kailey's previous message and verbalized understanding     Just check on Novolog they said generic not in covered but brand name is

## 2024-06-19 NOTE — TELEPHONE ENCOUNTER
Please let pt know that I will increase his Ozempic to 1 mg weekly. Continue Novolog 20 units before meals and Tresiba 60 units daily. [facility]

## 2024-07-01 ENCOUNTER — TELEPHONE (OUTPATIENT)
Dept: ENDOCRINOLOGY | Facility: CLINIC | Age: 64
End: 2024-07-01
Payer: MEDICARE

## 2024-07-01 NOTE — TELEPHONE ENCOUNTER
Left message for patient to call back. Needs f/u appt w/Kailey and to verify if he is using Nancy 2 or 3?

## 2024-07-08 ENCOUNTER — TELEPHONE (OUTPATIENT)
Dept: ENDOCRINOLOGY | Facility: CLINIC | Age: 64
End: 2024-07-08
Payer: MEDICARE

## 2024-07-19 ENCOUNTER — TELEPHONE (OUTPATIENT)
Dept: ENDOCRINOLOGY | Facility: CLINIC | Age: 64
End: 2024-07-19
Payer: MEDICARE

## 2024-07-19 ENCOUNTER — TELEPHONE (OUTPATIENT)
Dept: FAMILY MEDICINE | Facility: CLINIC | Age: 64
End: 2024-07-19
Payer: MEDICARE

## 2024-07-19 NOTE — TELEPHONE ENCOUNTER
S/w pt states UTI since yesterday.states he does not have PCP. He does not have transportation. Wants something for [facility]TI [facility]

## 2024-07-19 NOTE — TELEPHONE ENCOUNTER
Advised Mr. Rodriguez again that I sent him message with a On Demand Visit link through his [employer] so he could speak with a doctor. I canceled his appt b/c he's not a primary care pt, that he would have to be seen in office for a poss UTI, Also he couldn't get here because of no transportation. Pt MARJAN.

## 2024-07-19 NOTE — TELEPHONE ENCOUNTER
Advised Izzy Jennifer that his appt was scheduled incorrectly and Sent MyChart Message for OnDemand Virtual Visit. LISA PHILLIP.

## 2024-07-19 NOTE — TELEPHONE ENCOUNTER
----- Message from Malena Phillips sent at 7/19/2024 11:05 AM CDT -----  Contact: Patient  Type:  Patient Returning Call    Who Called:[Patient     Who Left Message for Patient:Kandy    Does the patient know what this is regarding?:Yes    Would the patient rather a call back or a response via [facility]? Call    Best Call Back Number:[phone] (home)     Additional Information: Patient would like to be called regarding why his appt was cancelled   Please call to advise

## 2024-07-19 NOTE — TELEPHONE ENCOUNTER
----- Message from Baljinder Morillo sent at 7/19/2024 11:42 AM CDT -----  Type:  Sooner Appointment Request  Caller is requesting a sooner appointment.  Caller declined first available appointment listed below.  Caller will not accept being placed on the waitlist and is requesting a message be sent to doctor.    Name of Caller:  pt    When is the first available appointment?  July 22    Symptoms:  uti, pain    Would the patient rather a call back or a response via [facility]? call    Best Call Back Number:  [phone]    Additional Information: Pt states that he is pain pain due to uti and can't been seen in person or go to hospital due to lack of transportation. He was supposed to be seen today but it was canceled.  Please call back to advise, thanks!

## 2024-07-19 NOTE — TELEPHONE ENCOUNTER
----- Message from Grayson Trotter sent at 7/19/2024  9:16 AM CDT -----  Contact: self  Type: Needs Medical Advice  Who Called:  PT  Symptoms (please be specific):  UTI  How long has patient had these symptoms:  since yesterday  Pharmacy name and phone #:    [facility] [place] - [address]  [address] [place] [address]  Phone: [phone] Fax: [phone]         Best Call Back Number: [phone]   Additional Information: PT would like an antibiotic called in.  Please call to advise.

## 2024-08-12 DIAGNOSIS — E11.65 TYPE 2 DIABETES MELLITUS WITH HYPERGLYCEMIA, WITHOUT LONG-TERM CURRENT USE OF INSULIN: ICD-10-CM

## 2024-08-12 RX ORDER — METFORMIN HYDROCHLORIDE 1000 MG/1
1000 TABLET ORAL 2 TIMES DAILY WITH MEALS
Qty: 180 TABLET | Refills: 1 | Status: SHIPPED | OUTPATIENT
Start: 2024-08-12

## 2024-09-04 ENCOUNTER — TELEPHONE (OUTPATIENT)
Dept: ENDOCRINOLOGY | Facility: CLINIC | Age: 64
End: 2024-09-04
Payer: MEDICARE

## 2024-09-04 DIAGNOSIS — E11.65 TYPE 2 DIABETES MELLITUS WITH HYPERGLYCEMIA, WITHOUT LONG-TERM CURRENT USE OF INSULIN: Primary | ICD-10-CM

## 2024-09-04 RX ORDER — TIRZEPATIDE 2.5 MG/.5ML
2.5 INJECTION, SOLUTION SUBCUTANEOUS
Qty: 2 ML | Refills: 0 | Status: SHIPPED | OUTPATIENT
Start: 2024-09-04

## 2024-09-04 RX ORDER — TIRZEPATIDE 5 MG/.5ML
5 INJECTION, SOLUTION SUBCUTANEOUS
Qty: 2 ML | Refills: 5 | Status: SHIPPED | OUTPATIENT
Start: 2024-10-04

## 2024-09-04 NOTE — TELEPHONE ENCOUNTER
S/w pt states he was on mounjaro once and it was working, then he had to switch to ozempic because mounjaro was out of stock,but he did not like ozempic it gave him diarrhea. He says mounjaro is back on stock at the [facility] to please send him a Rx. He's been out of the ozempic for 3-4 weeks. Notified him that you will probably sent the small dose since he has not taking it for awhile.pt verb understanding

## 2024-09-04 NOTE — TELEPHONE ENCOUNTER
----- Message from Alek Davis sent at 9/4/2024  8:15 AM CDT -----  Regarding: Refill request  Type:  RX Refill Request    Who Called: Pt  Refill or New Rx:refill    RX Name and Strength: Mounjaro   How is the patient currently taking it? (ex. 1XDay):as directed  Is this a 30 day or 90 day RX:30    Preferred Pharmacy with phone number:    [facility] [address] [facility] [place] [address]  Phone: [phone] Fax: [phone]        Local or Mail Order:local  Ordering Provider:Laina    Would the patient rather a call back or a response via [facility]? Call back    Best Call Back Number:[phone]      Additional Information: Sts he was told the Mounjaro was back in stock and he would like to go back to it.  Sts it gets delivered from the [facility].     Please advise -- Thank you

## 2024-09-04 NOTE — TELEPHONE ENCOUNTER
Mounjaro 2.5 mg sent to [facility] in [place]. Dose will be 2.5 mg weekly for 4 weeks. Then dose will increase to 5 mg weekly going forward. This rx was also sent to pharmacy.

## 2024-09-04 NOTE — TELEPHONE ENCOUNTER
Left message for patient to call back need to know how many mg if he taking of the mounjaro and how long has been out of it

## 2024-09-04 NOTE — TELEPHONE ENCOUNTER
----- Message from Traci Mast sent at 9/4/2024  8:23 AM CDT -----  Regarding: Returning call  Type:  Patient Returning Call    Who Called:  Pt  Who Left Message for Patient:  Kimberley  Does the patient know what this is regarding?:  yes  Best Call Back Number:  [phone]    Additional Information:  \

## 2024-09-06 ENCOUNTER — TELEPHONE (OUTPATIENT)
Dept: ENDOCRINOLOGY | Facility: CLINIC | Age: 64
End: 2024-09-06
Payer: MEDICARE

## 2024-09-06 NOTE — TELEPHONE ENCOUNTER
----- Message from Debby Juarez sent at 9/5/2024  4:45 PM CDT -----  Contact: Patient  Type:  Patient Returning Call    Who Called:  Patient  Who Left Message for Patient:  Kimberley  Does the patient know what this is regarding?:  No idea    Would the patient rather a call back or a response via [facility]?   Call back  Best Call Back Number:  [phone]    Additional Information:   States he is returning a missed call - please call back - thank you

## 2024-10-06 DIAGNOSIS — Z79.4 TYPE 2 DIABETES MELLITUS WITHOUT COMPLICATION, WITH LONG-TERM CURRENT USE OF INSULIN: ICD-10-CM

## 2024-10-06 DIAGNOSIS — E11.9 TYPE 2 DIABETES MELLITUS WITHOUT COMPLICATION, WITH LONG-TERM CURRENT USE OF INSULIN: ICD-10-CM

## 2024-10-08 RX ORDER — CALCIUM CITRATE/VITAMIN D3 200MG-6.25
TABLET ORAL
Qty: 100 EACH | Refills: 6 | Status: SHIPPED | OUTPATIENT
Start: 2024-10-08

## 2024-12-06 ENCOUNTER — TELEPHONE (OUTPATIENT)
Dept: ENDOCRINOLOGY | Facility: CLINIC | Age: 64
End: 2024-12-06
Payer: MEDICARE

## 2024-12-06 DIAGNOSIS — E11.65 TYPE 2 DIABETES MELLITUS WITH HYPERGLYCEMIA, WITHOUT LONG-TERM CURRENT USE OF INSULIN: ICD-10-CM

## 2024-12-06 RX ORDER — INSULIN ASPART 100 [IU]/ML
INJECTION, SOLUTION INTRAVENOUS; SUBCUTANEOUS
Qty: 30 ML | Refills: 1 | Status: SHIPPED | OUTPATIENT
Start: 2024-12-06 | End: 2025-01-25

## 2024-12-06 RX ORDER — TIRZEPATIDE 2.5 MG/.5ML
2.5 INJECTION, SOLUTION SUBCUTANEOUS
Qty: 2 ML | Refills: 1 | Status: SHIPPED | OUTPATIENT
Start: 2024-12-06

## 2024-12-06 RX ORDER — INSULIN DEGLUDEC 100 U/ML
INJECTION, SOLUTION SUBCUTANEOUS
Qty: 30 ML | Refills: 1 | Status: SHIPPED | OUTPATIENT
Start: 2024-12-06

## 2024-12-06 NOTE — TELEPHONE ENCOUNTER
Attempted to contact pt. No answer. LVM advising Ms. Bonner not in clinic on Fridays. Pt has upcoming virtual visit on 12/16 and advised pt to keep appt & Ms. Bonner will refill medication as long as on schedule to be seen and needs to keep appt to continue getting future refills. Pt needs to let us know what refills he is needing.
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
## 2024-12-16 ENCOUNTER — OFFICE VISIT (OUTPATIENT)
Dept: ENDOCRINOLOGY | Facility: CLINIC | Age: 64
End: 2024-12-16
Payer: MEDICARE

## 2024-12-16 DIAGNOSIS — E11.65 TYPE 2 DIABETES MELLITUS WITH HYPERGLYCEMIA, WITHOUT LONG-TERM CURRENT USE OF INSULIN: Primary | ICD-10-CM

## 2024-12-16 PROCEDURE — 99499 UNLISTED E&M SERVICE: CPT | Mod: 95,,, | Performed by: NURSE PRACTITIONER
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
## 2024-12-17 ENCOUNTER — TELEPHONE (OUTPATIENT)
Dept: ENDOCRINOLOGY | Facility: CLINIC | Age: 64
End: 2024-12-17
Payer: MEDICARE
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
## 2024-12-17 NOTE — TELEPHONE ENCOUNTER
----- Message from SaferTaxi sent at 12/17/2024  8:45 AM CST -----  Type:  Needs Medical Advice    Who Called: the patient  Symptoms (please be specific):  How long has patient had these symptoms:    Pharmacy name and phone #:    Would the patient rather a call back or a response via [facility]? call back  Best Call Back Number: [phone]   Additional Information: Pt states his sugar dropped down to 51   Please advise  Thanks

## 2024-12-17 NOTE — TELEPHONE ENCOUNTER
Please advise pt to reduce Novolog dose to 12 units for smaller meals (meals with less starch/carbs). Otherwise can continue 25 units with larger meals.
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
## 2024-12-18 NOTE — TELEPHONE ENCOUNTER
According to his med list,. Tresiba dose is 60 units. If he is having lows overnight or having blood sugars less than 100 upon waking in the morning, he can decrease this to 54 units going forward. If having lows during the course of the day, then it's likely not the Tresiba but the Novolog.

## 2025-01-09 ENCOUNTER — TELEPHONE (OUTPATIENT)
Dept: ENDOCRINOLOGY | Facility: CLINIC | Age: 65
End: 2025-01-09
Payer: MEDICARE

## 2025-01-09 ENCOUNTER — OFFICE VISIT (OUTPATIENT)
Dept: ENDOCRINOLOGY | Facility: CLINIC | Age: 65
End: 2025-01-09
Payer: MEDICARE

## 2025-01-09 DIAGNOSIS — I10 ESSENTIAL HYPERTENSION: ICD-10-CM

## 2025-01-09 DIAGNOSIS — Z86.73 H/O: CVA (CEREBROVASCULAR ACCIDENT): ICD-10-CM

## 2025-01-09 DIAGNOSIS — E11.65 TYPE 2 DIABETES MELLITUS WITH HYPERGLYCEMIA, WITH LONG-TERM CURRENT USE OF INSULIN: Primary | ICD-10-CM

## 2025-01-09 DIAGNOSIS — E78.5 HYPERLIPIDEMIA, UNSPECIFIED HYPERLIPIDEMIA TYPE: ICD-10-CM

## 2025-01-09 DIAGNOSIS — E66.01 SEVERE OBESITY (BMI 35.0-39.9) WITH COMORBIDITY: ICD-10-CM

## 2025-01-09 DIAGNOSIS — Z79.4 TYPE 2 DIABETES MELLITUS WITH DIABETIC NEPHROPATHY, WITH LONG-TERM CURRENT USE OF INSULIN: ICD-10-CM

## 2025-01-09 DIAGNOSIS — Z79.4 TYPE 2 DIABETES MELLITUS WITH HYPERGLYCEMIA, WITH LONG-TERM CURRENT USE OF INSULIN: Primary | ICD-10-CM

## 2025-01-09 DIAGNOSIS — E11.21 TYPE 2 DIABETES MELLITUS WITH DIABETIC NEPHROPATHY, WITH LONG-TERM CURRENT USE OF INSULIN: ICD-10-CM

## 2025-01-09 DIAGNOSIS — I25.10 CORONARY ARTERY DISEASE INVOLVING NATIVE CORONARY ARTERY OF NATIVE HEART WITHOUT ANGINA PECTORIS: ICD-10-CM

## 2025-01-09 RX ORDER — TIRZEPATIDE 5 MG/.5ML
5 INJECTION, SOLUTION SUBCUTANEOUS
Qty: 2 ML | Refills: 0 | Status: SHIPPED | OUTPATIENT
Start: 2025-01-09

## 2025-01-09 RX ORDER — TIRZEPATIDE 7.5 MG/.5ML
7.5 INJECTION, SOLUTION SUBCUTANEOUS
Qty: 2 ML | Refills: 6 | Status: SHIPPED | OUTPATIENT
Start: 2025-02-10

## 2025-01-09 RX ORDER — INSULIN DEGLUDEC 100 U/ML
INJECTION, SOLUTION SUBCUTANEOUS
Qty: 30 ML | Refills: 1 | Status: SHIPPED | OUTPATIENT
Start: 2025-01-09
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
## 2025-01-09 NOTE — TELEPHONE ENCOUNTER
It was the Dexcom G6. Order was submitted through [facility] in . He had previously told me he was going to ask his family to help him set it up and put it on.     I can try sending in the new Dexcom G7 because his supplies are likely . Let me know if he'd like me to send this in for him. Not sure if [facility] can provide or if we'll need to send to a different pharmacy, or possibly [facility] again

## 2025-01-09 NOTE — PATIENT INSTRUCTIONS
Resume Tresiba 40 units every morning.     Take last Mounjaro 2.5 mg pen when your dose is due. Then start the 5 mg dose the following week. You will take this dose for 4 weeks. Then you should receive shipment of the 7.5 mg pens that you will continue on (with refills).     Continue Novolog 15 units before each meal.     Continue metformin.    Notify me if blood sugars are often less than 80 or over 200.

## 2025-01-09 NOTE — TELEPHONE ENCOUNTER
S/w pt he wanted to let you know he never hooked up his dexcom and wanted to know if he will need a new one. When asked when he got it he did not know.When I asked him if it was the G6 or G7 he did not know.He said you should know.

## 2025-01-10 RX ORDER — BLOOD-GLUCOSE SENSOR
EACH MISCELLANEOUS
Qty: 9 EACH | Refills: 3 | Status: SHIPPED | OUTPATIENT
Start: 2025-01-10

## 2025-01-23 ENCOUNTER — TELEPHONE (OUTPATIENT)
Dept: ENDOCRINOLOGY | Facility: CLINIC | Age: 65
End: 2025-01-23
Payer: MEDICARE
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
## 2025-01-30 DIAGNOSIS — E11.65 TYPE 2 DIABETES MELLITUS WITH HYPERGLYCEMIA, WITHOUT LONG-TERM CURRENT USE OF INSULIN: ICD-10-CM

## 2025-01-30 RX ORDER — METFORMIN HYDROCHLORIDE 1000 MG/1
1000 TABLET ORAL 2 TIMES DAILY WITH MEALS
Qty: 180 TABLET | Refills: 1 | Status: SHIPPED | OUTPATIENT
Start: 2025-01-30

## 2025-04-08 ENCOUNTER — TELEPHONE (OUTPATIENT)
Dept: ENDOCRINOLOGY | Facility: CLINIC | Age: 65
End: 2025-04-08
Payer: MEDICARE

## 2025-04-08 NOTE — TELEPHONE ENCOUNTER
----- Message from Angi sent at 4/7/2025  2:48 PM CDT -----  Contact: self  Type:  Needs Medical AdviceWho Called: pt Symptoms (please be specific):  How long has patient had these symptoms:  Pharmacy name and phone #:  Would the patient rather a call back or a response via [facility]? callBest Call Back Number: [phone] Information: pt states he would like to speak with the nurse   Please call back to advise. Thanks!

## 2025-04-09 ENCOUNTER — TELEPHONE (OUTPATIENT)
Dept: ENDOCRINOLOGY | Facility: CLINIC | Age: 65
End: 2025-04-09
Payer: MEDICARE

## 2025-04-09 NOTE — TELEPHONE ENCOUNTER
S/w pt states he never received his Mounjaro from [facility].Notified him will call pharmacy to find out about this.Patient verb understanding

## 2025-04-09 NOTE — TELEPHONE ENCOUNTER
S/w [facility].they said they did not mail the mounjaro because pt p/u in January.I asked which mg he p/u and they said the 5mg.Notified pharmacist that his Rx was increased to 7.5. They said they will mail it to the patient    Called pt and Lm notifying him ofabove msg

## 2025-04-10 ENCOUNTER — OFFICE VISIT (OUTPATIENT)
Dept: ENDOCRINOLOGY | Facility: CLINIC | Age: 65
End: 2025-04-10
Payer: MEDICARE

## 2025-04-10 DIAGNOSIS — E11.65 TYPE 2 DIABETES MELLITUS WITH HYPERGLYCEMIA, WITHOUT LONG-TERM CURRENT USE OF INSULIN: ICD-10-CM

## 2025-04-10 DIAGNOSIS — Z79.4 TYPE 2 DIABETES MELLITUS WITH HYPERGLYCEMIA, WITH LONG-TERM CURRENT USE OF INSULIN: Primary | ICD-10-CM

## 2025-04-10 DIAGNOSIS — E78.5 HYPERLIPIDEMIA, UNSPECIFIED HYPERLIPIDEMIA TYPE: ICD-10-CM

## 2025-04-10 DIAGNOSIS — E11.21 TYPE 2 DIABETES MELLITUS WITH DIABETIC NEPHROPATHY, WITH LONG-TERM CURRENT USE OF INSULIN: ICD-10-CM

## 2025-04-10 DIAGNOSIS — I25.10 CORONARY ARTERY DISEASE INVOLVING NATIVE CORONARY ARTERY OF NATIVE HEART WITHOUT ANGINA PECTORIS: ICD-10-CM

## 2025-04-10 DIAGNOSIS — Z86.73 H/O: CVA (CEREBROVASCULAR ACCIDENT): ICD-10-CM

## 2025-04-10 DIAGNOSIS — E66.01 SEVERE OBESITY (BMI 35.0-39.9) WITH COMORBIDITY: ICD-10-CM

## 2025-04-10 DIAGNOSIS — I10 ESSENTIAL HYPERTENSION: ICD-10-CM

## 2025-04-10 DIAGNOSIS — Z79.4 TYPE 2 DIABETES MELLITUS WITH DIABETIC NEPHROPATHY, WITH LONG-TERM CURRENT USE OF INSULIN: ICD-10-CM

## 2025-04-10 DIAGNOSIS — E11.65 TYPE 2 DIABETES MELLITUS WITH HYPERGLYCEMIA, WITH LONG-TERM CURRENT USE OF INSULIN: Primary | ICD-10-CM

## 2025-04-10 PROCEDURE — 3046F HEMOGLOBIN A1C LEVEL >9.0%: CPT | Mod: CPTII,95,, | Performed by: NURSE PRACTITIONER

## 2025-04-10 PROCEDURE — 4010F ACE/ARB THERAPY RXD/TAKEN: CPT | Mod: CPTII,95,, | Performed by: NURSE PRACTITIONER

## 2025-04-10 PROCEDURE — 1160F RVW MEDS BY RX/DR IN RCRD: CPT | Mod: CPTII,95,, | Performed by: NURSE PRACTITIONER

## 2025-04-10 PROCEDURE — G2211 COMPLEX E/M VISIT ADD ON: HCPCS | Mod: 95,,, | Performed by: NURSE PRACTITIONER

## 2025-04-10 PROCEDURE — 1159F MED LIST DOCD IN RCRD: CPT | Mod: CPTII,95,, | Performed by: NURSE PRACTITIONER

## 2025-04-10 PROCEDURE — 98006 SYNCH AUDIO-VIDEO EST MOD 30: CPT | Mod: 95,,, | Performed by: NURSE PRACTITIONER

## 2025-04-10 RX ORDER — INSULIN ASPART 100 [IU]/ML
INJECTION, SOLUTION INTRAVENOUS; SUBCUTANEOUS
Qty: 30 ML | Refills: 6 | Status: SHIPPED | OUTPATIENT
Start: 2025-04-10

## 2025-04-10 RX ORDER — INSULIN DEGLUDEC 100 U/ML
INJECTION, SOLUTION SUBCUTANEOUS
Qty: 30 ML | Refills: 1 | Status: SHIPPED | OUTPATIENT
Start: 2025-04-10
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
## 2025-04-10 NOTE — PATIENT INSTRUCTIONS
Take Tresiba 30 units every morning.     Take Novolog 10 units before every meal.    Continue metformin.     Will resume Mounjaro at 2.5 mg. Then increase to 5 mg.

## 2025-04-11 ENCOUNTER — TELEPHONE (OUTPATIENT)
Dept: ENDOCRINOLOGY | Facility: CLINIC | Age: 65
End: 2025-04-11
Payer: MEDICARE

## 2025-04-11 DIAGNOSIS — E11.65 TYPE 2 DIABETES MELLITUS WITH HYPERGLYCEMIA, WITHOUT LONG-TERM CURRENT USE OF INSULIN: ICD-10-CM

## 2025-04-11 DIAGNOSIS — E11.65 TYPE 2 DIABETES MELLITUS WITH HYPERGLYCEMIA, WITH LONG-TERM CURRENT USE OF INSULIN: ICD-10-CM

## 2025-04-11 DIAGNOSIS — Z79.4 TYPE 2 DIABETES MELLITUS WITH HYPERGLYCEMIA, WITH LONG-TERM CURRENT USE OF INSULIN: ICD-10-CM

## 2025-04-11 RX ORDER — TIRZEPATIDE 2.5 MG/.5ML
2.5 INJECTION, SOLUTION SUBCUTANEOUS
Qty: 2 ML | Refills: 1 | Status: CANCELLED | OUTPATIENT
Start: 2025-04-11

## 2025-04-11 RX ORDER — TIRZEPATIDE 2.5 MG/.5ML
2.5 INJECTION, SOLUTION SUBCUTANEOUS
Qty: 2 ML | Refills: 0 | Status: SHIPPED | OUTPATIENT
Start: 2025-05-01

## 2025-04-11 RX ORDER — TIRZEPATIDE 5 MG/.5ML
5 INJECTION, SOLUTION SUBCUTANEOUS
Qty: 2 ML | Refills: 0 | Status: SHIPPED | OUTPATIENT
Start: 2025-05-22

## 2025-04-11 NOTE — TELEPHONE ENCOUNTER
Please call pt. I heard from the pharmacist and she said the Mounjaro dose that shipped out was the 7.5 mg dose. He cannot go from not taking straight up to the 7.5 mg dose.      She said insurance can't approve a new dose until ~ 21 days after.  Please have him hold onto the 7.5 mg dose in his fridge for now. So I am going to send the 2.5 mg dose to pharmacy and they will send that one out next.    The plan will be the 2.5 mg dose for 4 weeks, then the 5 mg dose for 4 weeks, then up to the 7.5 mg dose.     Once he resumes, he should let me know if having any nausea, constipation, diarrhea.

## 2025-04-11 NOTE — TELEPHONE ENCOUNTER
He told me during his appt yesterday that he had been off of Mounjaro for quite some time. Please clarify

## 2025-04-14 NOTE — TELEPHONE ENCOUNTER
Pt states he was out for awhile because he had not gotten the 5mg from pharmacy but he will start the 2.5mg he did received that from Pharmacy

## 2025-04-14 NOTE — TELEPHONE ENCOUNTER
Pharmacist said that they didn't send the 2.5 mg. They sent the 7.5 mg. I copies by message from last week so pt is clear on dosage instructions.     Please call pt. I heard from the pharmacist and she said the Mounjaro dose that shipped out was the 7.5 mg dose. He cannot go from not taking straight up to the 7.5 mg dose.       She said insurance can't approve a new dose until ~ 21 days after.  Please have him hold onto the 7.5 mg dose in his fridge for now. So I am going to send the 2.5 mg dose to pharmacy and they will send that one out next.     The plan will be the 2.5 mg dose for 4 weeks, then the 5 mg dose for 4 weeks, then up to the 7.5 mg dose.      Once he resumes, he should let me know if having any nausea, constipation, diarrhea.

## 2025-06-02 ENCOUNTER — TELEPHONE (OUTPATIENT)
Dept: ENDOCRINOLOGY | Facility: CLINIC | Age: 65
End: 2025-06-02
Payer: MEDICARE

## 2025-06-02 DIAGNOSIS — Z79.4 TYPE 2 DIABETES MELLITUS WITH DIABETIC NEPHROPATHY, WITH LONG-TERM CURRENT USE OF INSULIN: Primary | ICD-10-CM

## 2025-06-02 DIAGNOSIS — E11.21 TYPE 2 DIABETES MELLITUS WITH DIABETIC NEPHROPATHY, WITH LONG-TERM CURRENT USE OF INSULIN: Primary | ICD-10-CM

## 2025-06-02 DIAGNOSIS — E11.41 DIABETIC MONONEUROPATHY ASSOCIATED WITH TYPE 2 DIABETES MELLITUS: ICD-10-CM

## 2025-06-02 RX ORDER — GABAPENTIN 300 MG/1
CAPSULE ORAL
Qty: 60 CAPSULE | Refills: 6 | Status: SHIPPED | OUTPATIENT
Start: 2025-06-02

## 2025-06-14 DIAGNOSIS — E11.9 TYPE 2 DIABETES MELLITUS WITHOUT COMPLICATION, WITH LONG-TERM CURRENT USE OF INSULIN: ICD-10-CM

## 2025-06-14 DIAGNOSIS — Z79.4 TYPE 2 DIABETES MELLITUS WITHOUT COMPLICATION, WITH LONG-TERM CURRENT USE OF INSULIN: ICD-10-CM

## 2025-06-16 RX ORDER — CALCIUM CITRATE/VITAMIN D3 200MG-6.25
TABLET ORAL
Qty: 100 EACH | Refills: 6 | Status: SHIPPED | OUTPATIENT
Start: 2025-06-16

## 2025-06-24 DIAGNOSIS — Z79.4 TYPE 2 DIABETES MELLITUS WITH HYPERGLYCEMIA, WITH LONG-TERM CURRENT USE OF INSULIN: ICD-10-CM

## 2025-06-24 DIAGNOSIS — E11.65 TYPE 2 DIABETES MELLITUS WITH HYPERGLYCEMIA, WITH LONG-TERM CURRENT USE OF INSULIN: ICD-10-CM

## 2025-06-24 RX ORDER — TIRZEPATIDE 5 MG/.5ML
5 INJECTION, SOLUTION SUBCUTANEOUS
Qty: 2 ML | Refills: 0 | Status: SHIPPED | OUTPATIENT
Start: 2025-06-24

## 2025-07-09 ENCOUNTER — TELEPHONE (OUTPATIENT)
Dept: ENDOCRINOLOGY | Facility: CLINIC | Age: 65
End: 2025-07-09
Payer: MEDICARE

## 2025-07-11 ENCOUNTER — TELEPHONE (OUTPATIENT)
Dept: ENDOCRINOLOGY | Facility: CLINIC | Age: 65
End: 2025-07-11
Payer: MEDICARE

## 2025-07-11 NOTE — TELEPHONE ENCOUNTER
Copied from CRM #[id number]. Topic: Appointments - Appointment Access  >> Jul 11, 2025  2:30 PM Ольга wrote:  Type:  Needs Medical Advice    Who Called: Patient   Symptoms (please be specific):  n/a    How long has patient had these symptoms:   n/a   Pharmacy name and phone #:   n/a   Would the patient rather a call back or a response via [facility]?  Call back   Best Call Back Number:  [phone]  Additional Information: Patient is requesting a call back in reference to virtual appt missed on 07/10/2025 Please Assist

## 2025-07-11 NOTE — TELEPHONE ENCOUNTER
Called pt and asked him if he wanted to r/s his virtual visit he missed yesterday and he said he already talked to someone to have Kailey call him(don't see any calls regarding this).He said he'll call next week to schedule because he is in bed w/broken back and just had surgery. I offered him next Tuesday but he declined saying he will call next week. Notified him if he does there is nothing until September,he kept saying he will call next week.

## 2025-07-14 ENCOUNTER — TELEPHONE (OUTPATIENT)
Dept: ENDOCRINOLOGY | Facility: CLINIC | Age: 65
End: 2025-07-14
Payer: MEDICARE

## 2025-07-14 NOTE — TELEPHONE ENCOUNTER
This was sent to wrong provider, as I don't prescribe lasix for him. This should be addressed by his PCP, who has previously managed his swelling. I tried to forward this to her but she might be outside the system. Please advise pt to call Dr. Devine's office.

## 2025-07-14 NOTE — TELEPHONE ENCOUNTER
Copied from CRM #[id number]. Topic: General Inquiry - Patient Advice  >> Jul 14, 2025  3:06 PM Stefanie wrote:  Type:  Needs Medical Advice    Who Called: pt  Symptoms (please be specific): fluid build up on legs   How long has patient had these symptoms:  few months  Pharmacy name and phone #:    [facility] - [place] - [address]  [address]  [facility] [address]  Phone: [phone] Fax: [phone]    Would the patient rather a call back or a response via [facility]? call  Best Call Back Number: [phone]    Additional Information: Pt states he is taking Laysik and doesn't think its not working.

## 2025-07-14 NOTE — TELEPHONE ENCOUNTER
Pts states he having fluid build up on legs and does not think the Lasix is working. However, I do not see this on his medication list. The only fluid pill I see is an old hydrochlorothiazide prescription from 2015.

## 2025-07-29 DIAGNOSIS — E11.65 TYPE 2 DIABETES MELLITUS WITH HYPERGLYCEMIA, WITH LONG-TERM CURRENT USE OF INSULIN: ICD-10-CM

## 2025-07-29 DIAGNOSIS — Z79.4 TYPE 2 DIABETES MELLITUS WITH HYPERGLYCEMIA, WITH LONG-TERM CURRENT USE OF INSULIN: ICD-10-CM

## 2025-07-30 DIAGNOSIS — E11.65 TYPE 2 DIABETES MELLITUS WITH HYPERGLYCEMIA, WITHOUT LONG-TERM CURRENT USE OF INSULIN: ICD-10-CM

## 2025-07-30 RX ORDER — METFORMIN HYDROCHLORIDE 1000 MG/1
1000 TABLET ORAL 2 TIMES DAILY WITH MEALS
Qty: 180 TABLET | Refills: 1 | OUTPATIENT
Start: 2025-07-30

## 2025-07-30 RX ORDER — TIRZEPATIDE 5 MG/.5ML
5 INJECTION, SOLUTION SUBCUTANEOUS
Qty: 2 ML | Refills: 0 | Status: SHIPPED | OUTPATIENT
Start: 2025-07-30

## 2025-08-04 ENCOUNTER — TELEPHONE (OUTPATIENT)
Dept: ENDOCRINOLOGY | Facility: CLINIC | Age: 65
End: 2025-08-04
Payer: MEDICARE

## 2025-08-04 DIAGNOSIS — Z79.4 TYPE 2 DIABETES MELLITUS WITH HYPERGLYCEMIA, WITH LONG-TERM CURRENT USE OF INSULIN: ICD-10-CM

## 2025-08-04 DIAGNOSIS — E11.65 TYPE 2 DIABETES MELLITUS WITH HYPERGLYCEMIA, WITH LONG-TERM CURRENT USE OF INSULIN: ICD-10-CM

## 2025-08-04 RX ORDER — TIRZEPATIDE 5 MG/.5ML
5 INJECTION, SOLUTION SUBCUTANEOUS
Qty: 2 ML | Refills: 6 | Status: SHIPPED | OUTPATIENT
Start: 2025-08-04

## 2025-08-04 NOTE — TELEPHONE ENCOUNTER
Copied from CRM #[id number]. Topic: General Inquiry - Patient Advice  >> Aug 4, 2025 11:30 AM Alberta wrote:  Type: Needs Medical Advice  Who Called:  Patient   Symptoms (please be specific):    How long has patient had these symptoms:    Pharmacy name and phone #:    Best Call Back Number: [phone]  Additional Information: Patient called to inform that his meds. as been sitting outside his home for 3 days and he would like to know if they are still good to take. Patient is requesting a call back from the nurse.

## 2025-08-04 NOTE — TELEPHONE ENCOUNTER
Attempted to contact pt. No answer. Phone went straight to . LVM advising pt any medications left out in this heat x3 days (especially if its his insulin) will no longer be good. Only thing we can advise is to contact pharmacy and explain why medications were left outside and see if they can accommodate him with an override to get refills earlier.

## 2025-08-15 DIAGNOSIS — E11.65 TYPE 2 DIABETES MELLITUS WITH HYPERGLYCEMIA, WITHOUT LONG-TERM CURRENT USE OF INSULIN: ICD-10-CM

## 2025-08-15 RX ORDER — METFORMIN HYDROCHLORIDE 1000 MG/1
1000 TABLET ORAL 2 TIMES DAILY WITH MEALS
Qty: 180 TABLET | Refills: 1 | Status: SHIPPED | OUTPATIENT
Start: 2025-08-15